# Patient Record
Sex: MALE | Race: WHITE | NOT HISPANIC OR LATINO | Employment: FULL TIME | URBAN - METROPOLITAN AREA
[De-identification: names, ages, dates, MRNs, and addresses within clinical notes are randomized per-mention and may not be internally consistent; named-entity substitution may affect disease eponyms.]

---

## 2020-02-19 ENCOUNTER — APPOINTMENT (OUTPATIENT)
Dept: RADIOLOGY | Facility: CLINIC | Age: 30
End: 2020-02-19
Payer: COMMERCIAL

## 2020-02-19 VITALS
HEIGHT: 70 IN | WEIGHT: 232.2 LBS | DIASTOLIC BLOOD PRESSURE: 67 MMHG | BODY MASS INDEX: 33.24 KG/M2 | HEART RATE: 54 BPM | SYSTOLIC BLOOD PRESSURE: 112 MMHG

## 2020-02-19 DIAGNOSIS — M25.512 LEFT SHOULDER PAIN, UNSPECIFIED CHRONICITY: Primary | ICD-10-CM

## 2020-02-19 DIAGNOSIS — M25.512 LEFT SHOULDER PAIN, UNSPECIFIED CHRONICITY: ICD-10-CM

## 2020-02-19 DIAGNOSIS — G54.0 BRACHIAL PLEXOPATHY: ICD-10-CM

## 2020-02-19 PROCEDURE — 99204 OFFICE O/P NEW MOD 45 MIN: CPT | Performed by: ORTHOPAEDIC SURGERY

## 2020-02-19 PROCEDURE — 73030 X-RAY EXAM OF SHOULDER: CPT

## 2024-01-02 NOTE — PROGRESS NOTES
Assessment/Plan:  1  Left shoulder pain, unspecified chronicity  XR shoulder 2+ vw left   2  Brachial plexopathy  EMG 1 Limb       Scribe Attestation    I,:   Kalyanyamileth Mar Angela am acting as a scribe while in the presence of the attending physician :        I,:   Josemanuel Boyer MD personally performed the services described in this documentation    as scribed in my presence :              Yuki Contreras is presenting with neurological symptoms of the left upper extremity  He does have sensation changes as well as pain in the C5 and C6 dermatome  The left shoulder has excellent strength and range of motion  I do have some concern of an injury to the brachial plexus verses a cervical radiculopathy  I have ordered an EMG study of the left upper extremity questioning this  Once the study has completed and the report is provided by Neurology he will return to see me and we will formulate a plan of care  Subjective:   Joseph Greene is a 30 y o  male who presents to the office today for evaluation of left shoulder  Yuki Contreras is as pain was left shoulder for over year  He describes the pain as a moderate ache that originates in the left trapezius and periscapular region and radiates into the left shoulder and medial biceps  He states it feels as though a nerve is pinched  Overuse of the left upper extremity from 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 will cause his shoulder to become aggravated  At times he feels as though the left arm becomes useless due to his shoulder pain  Yuki Contreras works as a   [employer] will cause an aggravation of his symptoms  Yuki Contreras does do resistance training  He notices a difference from the left and right upper extremity in regards to strength  Pertinent history includes an injury to left shoulder in high school when he ran into [employer]  He states the shoulder was immobilized in a sling for months      Review of Systems   Constitutional: Negative for chills, fever and unexpected weight change  HENT: Negative for hearing loss, nosebleeds and sore throat  Eyes: Negative for pain, redness and visual disturbance  Respiratory: Negative for cough, shortness of breath and wheezing  Cardiovascular: Negative for chest pain, palpitations and leg swelling  Gastrointestinal: Negative for abdominal pain, nausea and vomiting  Endocrine: Negative for polyphagia and polyuria  Genitourinary: Negative for dysuria and hematuria  Musculoskeletal:        See HPI   Skin: Negative for rash and wound  Neurological: Negative for dizziness, numbness and headaches  Psychiatric/Behavioral: Negative for decreased concentration and suicidal ideas  The patient is not nervous/anxious  History reviewed  No pertinent past medical history  Past Surgical History:   Procedure Laterality Date    APPENDECTOMY         Family History   Problem Relation Age of Onset    No Known Problems Mother     No Known Problems Father     No Known Problems Sister     No Known Problems Brother     No Known Problems Maternal Aunt     No Known Problems Maternal Uncle     No Known Problems Paternal Aunt     No Known Problems Paternal Uncle     No Known Problems Maternal Grandmother     No Known Problems Maternal Grandfather     No Known Problems Paternal Grandmother     No Known Problems Paternal Grandfather        Social History     Occupational History    Not on file   Tobacco Use    Smoking status: Never Smoker    Smokeless tobacco: Never Used   Substance and Sexual Activity    Alcohol use: Yes     Comment: socially    Drug use: Not on file    Sexual activity: Not on file       No current outpatient medications on file  No Known Allergies    Objective:  Vitals:    02/19/20 1041   BP: 112/67   Pulse: (!) 54       Back Exam     Tenderness   The patient is experiencing tenderness in the cervical (Left trapezius, paraspinal)        Left Shoulder Exam     Tenderness   The patient is experiencing no tenderness  Range of Motion   Active abduction: 160   External rotation: 80   Forward flexion: 180   Internal rotation 0 degrees: T1     Muscle Strength   Abduction: 5/5   Internal rotation: 5/5   External rotation: 5/5   Supraspinatus: 5/5   Subscapularis: 5/5   Biceps: 5/5     Tests   Hargrove test: negative  Impingement: negative    Other   Erythema: absent  Sensation: decreased (C5-C6 dermatome)  Pulse: present             Physical Exam   Constitutional: He is oriented to person, place, and time  He appears well-developed and well-nourished  HENT:   Head: Normocephalic and atraumatic  Eyes: Conjunctivae are normal  Right eye exhibits no discharge  Left eye exhibits no discharge  Neck: Normal range of motion  Neck supple  Cardiovascular: Regular rhythm and intact distal pulses  Pulmonary/Chest: Effort normal  No respiratory distress  Neurological: He is alert and oriented to person, place, and time  Skin: Skin is warm and dry  Psychiatric: He has a normal mood and affect  His behavior is normal    Vitals reviewed  I have personally reviewed pertinent films in PACS and my interpretation is as follows:  X-rays left shoulder demonstrate no evidence of acute fracture  There is questionable evidence of an old fracture to the posterior head of the humerus  There is also history of previous acromioclavicular joint sprain  104.3

## 2025-04-18 ENCOUNTER — APPOINTMENT (OUTPATIENT)
Dept: RADIOLOGY | Facility: CLINIC | Age: 35
End: 2025-04-18
Attending: ORTHOPAEDIC SURGERY
Payer: COMMERCIAL

## 2025-04-18 ENCOUNTER — OFFICE VISIT (OUTPATIENT)
Age: 35
End: 2025-04-18
Payer: COMMERCIAL

## 2025-04-18 VITALS — WEIGHT: 210 LBS | HEIGHT: 70 IN | BODY MASS INDEX: 30.06 KG/M2

## 2025-04-18 DIAGNOSIS — M25.522 PAIN IN LEFT ELBOW: ICD-10-CM

## 2025-04-18 DIAGNOSIS — M79.602 LEFT ARM PAIN: ICD-10-CM

## 2025-04-18 DIAGNOSIS — S46.212A RUPTURE OF LEFT DISTAL BICEPS TENDON, INITIAL ENCOUNTER: Primary | ICD-10-CM

## 2025-04-18 PROCEDURE — 99204 OFFICE O/P NEW MOD 45 MIN: CPT | Performed by: ORTHOPAEDIC SURGERY

## 2025-04-18 PROCEDURE — 73080 X-RAY EXAM OF ELBOW: CPT

## 2025-04-18 NOTE — ASSESSMENT & PLAN NOTE
Stat MRI ordered today, patient will follow up on Tuesday/Wednesday to review MRI and discuss surgery further  Orders:  •  MRI elbow left wo contrast; Future

## 2025-04-18 NOTE — PROGRESS NOTES
Patient Name: Shyam Headley      : 1990       MRN: [id number]   Encounter Provider: Marco You MD   Encounter Date: 25  Encounter department: [facility] [place]         Assessment & Plan  Rupture of left distal biceps tendon, initial encounter  Stat MRI ordered today, patient will follow up on Tuesday/Wednesday to review MRI and discuss surgery further  Orders:  •  MRI elbow left wo contrast; Future    Pain in left elbow    Orders:  •  XR elbow 3+ vw left; Future  •  MRI elbow left wo contrast; Future       Plan:  Shyam presents for initial evaluation of acute left elbow injury. Upon review of the left elbow x-ray, a thorough history and my examination, Shyam is presenting with signs and symptoms consistent with distal bicep tendon rupture. Etiology and treatment options discussed, all his questions were addressed. Based on patients age and the physical nature of his job I would recommend surgical intervention in the form of left elbow arthroscopy with bicep repair. A STAT MRI is warranted for further evaluation and pre-operative planning. Patient will follow next week to review MRI and discuss surgery further.       Follow-up:  No follow-ups on file.     _____________________________________________________  CHIEF COMPLAINT:  Chief Complaint   Patient presents with   • Left Upper Arm - Pain         SUBJECTIVE:  Shyam Headley is a 35 y.o. male who presents for initial evaluation of left elbow. Patient had a hyperextension injury yesterday, he felt a pop followed by pain. Pain is localized to the cubital fossa. Pain is aggravated with pronation/supination of the wrist and elbow flexion. Pain radiates into proximal forearm. He notes a deformity to his bicep with weakness.       PAST MEDICAL HISTORY:  No past medical history on file.    PAST SURGICAL HISTORY:  Past Surgical History:   Procedure Laterality Date   • APPENDECTOMY         FAMILY HISTORY:  Family  "History   Problem Relation Age of Onset   • No Known Problems Mother    • No Known Problems Father    • No Known Problems Sister    • No Known Problems Brother    • No Known Problems Maternal Aunt    • No Known Problems Maternal Uncle    • No Known Problems Paternal Aunt    • No Known Problems Paternal Uncle    • No Known Problems Maternal Grandmother    • No Known Problems Maternal Grandfather    • No Known Problems Paternal Grandmother    • No Known Problems Paternal Grandfather        SOCIAL HISTORY:  Social History     Tobacco Use   • Smoking status: Never   • Smokeless tobacco: Never   Vaping Use   • Vaping status: Never Used   Substance Use Topics   • Alcohol use: Yes     Comment: socially       MEDICATIONS:  No current outpatient medications on file.    ALLERGIES:  No Known Allergies    LABS:  HgA1c: No results found for: \"HGBA1C\"  BMP: No results found for: \"GLUCOSE\", \"CALCIUM\", \"NA\", \"K\", \"CO2\", \"CL\", \"BUN\", \"CREATININE\"  CBC: No components found for: \"CBC\"    _____________________________________________________  Review of Systems   Constitutional:  Negative for chills and fever.   HENT:  Negative for ear pain and sore throat.    Eyes:  Negative for pain and visual disturbance.   Respiratory:  Negative for cough and shortness of breath.    Cardiovascular:  Negative for chest pain and palpitations.   Gastrointestinal:  Negative for abdominal pain and vomiting.   Genitourinary:  Negative for dysuria and hematuria.   Musculoskeletal:  Negative for arthralgias and back pain.   Skin:  Negative for color change and rash.   Neurological:  Negative for seizures and syncope.   All other systems reviewed and are negative.       Left Elbow Exam     Tenderness   Left elbow tenderness location: distal bicep.     Tests   Varus: negative  Valgus: negative    Other   Erythema: absent  Sensation: normal  Pulse: present    Comments:  Pain with resisted supination  (+) hook test   No significant ecchymosis  AROM limited " secondary to injury  (-) radial head instability  (-) ulnar nerve subluxation  2+ distal radial pulse with brisk capillary refill to the fingers  Radial, median, and ulnar motor and sensory distrubution intact  Sensation to light touch intact distally                Physical Exam  Vitals and nursing note reviewed.   Constitutional:       Appearance: Normal appearance.   HENT:      Head: Normocephalic and atraumatic.      Right Ear: External ear normal.      Left Ear: External ear normal.   Eyes:      Extraocular Movements: Extraocular movements intact.      Conjunctiva/sclera: Conjunctivae normal.   Cardiovascular:      Rate and Rhythm: Normal rate.      Pulses: Normal pulses.   Pulmonary:      Effort: Pulmonary effort is normal.   Musculoskeletal:         General: Normal range of motion.      Cervical back: Normal range of motion and neck supple.      Comments: See ortho exam   Skin:     General: Skin is warm and dry.   Neurological:      General: No focal deficit present.      Mental Status: He is alert.   Psychiatric:         Behavior: Behavior normal.        _____________________________________________________  STUDIES REVIEWED:  I personally reviewed the pertinent images and my independent interpretation is as follows:  X-rays of the left elbow obtained today demonstrate no acute osseous abnormalities     PROCEDURES PERFORMED:  No Procedures performed today    Scribe Attestation    I,:  Kalie Spring am acting as a scribe while in the presence of the attending physician.:       I,:  Marco You MD personally performed the services described in this documentation    as scribed in my presence.:

## 2025-04-19 ENCOUNTER — HOSPITAL ENCOUNTER (OUTPATIENT)
Dept: RADIOLOGY | Facility: HOSPITAL | Age: 35
Discharge: HOME | End: 2025-04-19
Attending: ORTHOPAEDIC SURGERY
Payer: COMMERCIAL

## 2025-04-19 DIAGNOSIS — M25.522 PAIN IN LEFT ELBOW: ICD-10-CM

## 2025-04-19 DIAGNOSIS — S46.212A RUPTURE OF LEFT DISTAL BICEPS TENDON, INITIAL ENCOUNTER: ICD-10-CM

## 2025-04-19 PROCEDURE — 73221 MRI JOINT UPR EXTREM W/O DYE: CPT

## 2025-04-22 RX ORDER — CHLORHEXIDINE GLUCONATE ORAL RINSE 1.2 MG/ML
15 SOLUTION DENTAL ONCE
Status: CANCELLED | OUTPATIENT
Start: 2025-04-22 | End: 2025-04-22

## 2025-04-23 ENCOUNTER — ANESTHESIA EVENT (OUTPATIENT)
Dept: PERIOP | Facility: HOSPITAL | Age: 35
End: 2025-04-23
Payer: COMMERCIAL

## 2025-04-23 VITALS — WEIGHT: 210 LBS | HEIGHT: 70 IN | BODY MASS INDEX: 30.06 KG/M2

## 2025-04-23 DIAGNOSIS — M25.522 PAIN IN LEFT ELBOW: ICD-10-CM

## 2025-04-23 DIAGNOSIS — S46.212D RUPTURE OF LEFT DISTAL BICEPS TENDON, SUBSEQUENT ENCOUNTER: Primary | ICD-10-CM

## 2025-04-23 PROCEDURE — 99214 OFFICE O/P EST MOD 30 MIN: CPT | Performed by: ORTHOPAEDIC SURGERY

## 2025-04-23 RX ORDER — MAGNESIUM 30 MG
30 TABLET ORAL 2 TIMES DAILY
COMMUNITY

## 2025-04-23 NOTE — H&P (VIEW-ONLY)
Patient Name: Shyam Headley      : 1990       MRN: [id number]   Encounter Provider: Marco You MD   Encounter Date: 25  Encounter department: [facility] [place]         Assessment & Plan  Rupture of left distal biceps tendon, subsequent encounter  left elbow distal biceps tendon repair, surgery discussion below       Pain in left elbow            Plan:  Shyam is a 35 y.o. with acute left bicep tendon rupture. MRI reviewed with patient today and treatment options discussed.  We did discuss both operative and nonoperative treatments for biceps tendon tears.  We discussed without surgical invention he may have some chronic weakness with supination and somewhat flexion.  He is an active  and would require full strength to get back to work and do all of his activities.  He also exercises and is active with weightlifting.  After full discussion he would like to pursue operative intervention.  All questions were answered.   We discussed risks, benefits and alternatives to surgery today in the clinic. We discussed with the patient the risks of no treatment, non-operative treatment, and operative treatment. The risks of operative intervention were discussed and include but are not limited to: Infection, bleeding, stiffness, loss of range of motion, blood clot, failure of surgery, fracture, delayed union, nonunion, malunion, risk of potential future arthritis, continued problems with swelling, injury to surrounding structures/nerve/artery/vein, recurrence of cysts, failure of hardware, retained hardware and/or foreign body, symptomatic hardware, and continued instability, pain, dysfunction, or disability despite repair.     Specifically, for     Arthroscopy with distal biceps tendon repair : Risk of Sofia tear.  Risk of numbness in the lateral antebrachial cutaneous nerve distribution.  Risk of neurovascular injury.  Risk of heterotopic  "ossification.      Follow-up:  No follow-ups on file.     _____________________________________________________  CHIEF COMPLAINT:  Chief Complaint   Patient presents with   • Left Upper Arm - Post-op         SUBJECTIVE:  malik Headley is a 35 y.o. male who presents for follow up evaluation of left elbow secondary to bicep tendon rupture. MRI was reviewed with patient over the phone he presents today to discuss surgery and sign consent.      PAST MEDICAL HISTORY:  History reviewed. No pertinent past medical history.    PAST SURGICAL HISTORY:  Past Surgical History:   Procedure Laterality Date   • APPENDECTOMY         FAMILY HISTORY:  Family History   Problem Relation Age of Onset   • No Known Problems Mother    • No Known Problems Father    • No Known Problems Sister    • No Known Problems Brother    • No Known Problems Maternal Aunt    • No Known Problems Maternal Uncle    • No Known Problems Paternal Aunt    • No Known Problems Paternal Uncle    • No Known Problems Maternal Grandmother    • No Known Problems Maternal Grandfather    • No Known Problems Paternal Grandmother    • No Known Problems Paternal Grandfather        SOCIAL HISTORY:  Social History     Tobacco Use   • Smoking status: Never   • Smokeless tobacco: Never   Vaping Use   • Vaping status: Never Used   Substance Use Topics   • Alcohol use: Not Currently     Comment: socially   • Drug use: Never       MEDICATIONS:  No current facility-administered medications for this visit.  No current outpatient medications on file.    Facility-Administered Medications Ordered in Other Visits:   •  bupivacaine liposomal (EXPAREL) 1.3 % injection 20 mL, 20 mL, Infiltration, Once, Marcus Leon MD  •  lactated ringers infusion, 75 mL/hr, Intravenous, Continuous, Marcus Leon MD    ALLERGIES:  No Known Allergies    LABS:  HgA1c: No results found for: \"HGBA1C\"  BMP: No results found for: \"GLUCOSE\", \"CALCIUM\", \"NA\", \"K\", \"CO2\", \"CL\", \"BUN\", " "\"CREATININE\"  CBC: No components found for: \"CBC\"    _____________________________________________________  Review of Systems   Constitutional:  Negative for chills and fever.   HENT:  Negative for ear pain and sore throat.    Eyes:  Negative for pain and visual disturbance.   Respiratory:  Negative for cough and shortness of breath.    Cardiovascular:  Negative for chest pain and palpitations.   Gastrointestinal:  Negative for abdominal pain and vomiting.   Genitourinary:  Negative for dysuria and hematuria.   Musculoskeletal:  Negative for arthralgias and back pain.   Skin:  Negative for color change and rash.   Neurological:  Negative for seizures and syncope.   All other systems reviewed and are negative.       Left Elbow Exam     Other   Erythema: absent  Sensation: normal  Pulse: present    Comments:  Pain with resisted supination  (+) hook test   No significant ecchymosis  AROM limited secondary to injury  (-) radial head instability  (-) ulnar nerve subluxation  2+ distal radial pulse with brisk capillary refill to the fingers  Radial, median, and ulnar motor and sensory distrubution intact  Sensation to light touch intact distally                Physical Exam  Vitals and nursing note reviewed.   Constitutional:       Appearance: Normal appearance.   HENT:      Head: Normocephalic and atraumatic.      Right Ear: External ear normal.      Left Ear: External ear normal.   Eyes:      Extraocular Movements: Extraocular movements intact.      Conjunctiva/sclera: Conjunctivae normal.   Cardiovascular:      Rate and Rhythm: Normal rate.      Pulses: Normal pulses.   Pulmonary:      Effort: Pulmonary effort is normal.   Musculoskeletal:         General: Normal range of motion.      Cervical back: Normal range of motion and neck supple.      Comments: See ortho exam   Skin:     General: Skin is warm and dry.   Neurological:      General: No focal deficit present.      Mental Status: He is alert.   Psychiatric:        "  Behavior: Behavior normal.     Abdominal: Soft nontender  _____________________________________________________  STUDIES REVIEWED:  I personally reviewed the pertinent images and my independent interpretation is as follows:  MRI of left elbow obtained 4/19/25 demonstrates complete tear of the distal biceps brachii tendon with torn tendon edge retracted    PROCEDURES PERFORMED:  No Procedures performed today    Scribe Attestation    I,:  Kalie Spring am acting as a scribe while in the presence of the attending physician.:       I,:  Marco You MD personally performed the services described in this documentation    as scribed in my presence.:

## 2025-04-23 NOTE — PROGRESS NOTES
Patient Name: Shyam Headley      : 1990       MRN: [id number]   Encounter Provider: Marco You MD   Encounter Date: 25  Encounter department: [facility]         Assessment & Plan  Rupture of left distal biceps tendon, subsequent encounter  left elbow distal biceps tendon repair, surgery discussion below       Pain in left elbow            Plan:  Shyam is a 35 y.o. with acute left bicep tendon rupture. MRI reviewed with patient today and treatment options discussed.  We did discuss both operative and nonoperative treatments for biceps tendon tears.  We discussed without surgical invention he may have some chronic weakness with supination and somewhat flexion.  He is an active  and would require full strength to get back to work and do all of his activities.  He also exercises and is active with weightlifting.  After full discussion he would like to pursue operative intervention.  All questions were answered.   We discussed risks, benefits and alternatives to surgery today in the clinic. We discussed with the patient the risks of no treatment, non-operative treatment, and operative treatment. The risks of operative intervention were discussed and include but are not limited to: Infection, bleeding, stiffness, loss of range of motion, blood clot, failure of surgery, fracture, delayed union, nonunion, malunion, risk of potential future arthritis, continued problems with swelling, injury to surrounding structures/nerve/artery/vein, recurrence of cysts, failure of hardware, retained hardware and/or foreign body, symptomatic hardware, and continued instability, pain, dysfunction, or disability despite repair.     Specifically, for     Arthroscopy with distal biceps tendon repair : Risk of Sofia tear.  Risk of numbness in the lateral antebrachial cutaneous nerve distribution.  Risk of neurovascular injury.  Risk of heterotopic  "ossification.      Follow-up:  No follow-ups on file.     _____________________________________________________  CHIEF COMPLAINT:  Chief Complaint   Patient presents with   • Left Upper Arm - Post-op         SUBJECTIVE:  malik Headley is a 35 y.o. male who presents for follow up evaluation of left elbow secondary to bicep tendon rupture. MRI was reviewed with patient over the phone he presents today to discuss surgery and sign consent.      PAST MEDICAL HISTORY:  History reviewed. No pertinent past medical history.    PAST SURGICAL HISTORY:  Past Surgical History:   Procedure Laterality Date   • APPENDECTOMY         FAMILY HISTORY:  Family History   Problem Relation Age of Onset   • No Known Problems Mother    • No Known Problems Father    • No Known Problems Sister    • No Known Problems Brother    • No Known Problems Maternal Aunt    • No Known Problems Maternal Uncle    • No Known Problems Paternal Aunt    • No Known Problems Paternal Uncle    • No Known Problems Maternal Grandmother    • No Known Problems Maternal Grandfather    • No Known Problems Paternal Grandmother    • No Known Problems Paternal Grandfather        SOCIAL HISTORY:  Social History     Tobacco Use   • Smoking status: Never   • Smokeless tobacco: Never   Vaping Use   • Vaping status: Never Used   Substance Use Topics   • Alcohol use: Not Currently     Comment: socially   • Drug use: Never       MEDICATIONS:  No current facility-administered medications for this visit.  No current outpatient medications on file.    Facility-Administered Medications Ordered in Other Visits:   •  bupivacaine liposomal (EXPAREL) 1.3 % injection 20 mL, 20 mL, Infiltration, Once, Marcus Leon MD  •  lactated ringers infusion, 75 mL/hr, Intravenous, Continuous, Marcus Leon MD    ALLERGIES:  No Known Allergies    LABS:  HgA1c: No results found for: \"HGBA1C\"  BMP: No results found for: \"GLUCOSE\", \"CALCIUM\", \"NA\", \"K\", \"CO2\", \"CL\", \"BUN\", " "\"CREATININE\"  CBC: No components found for: \"CBC\"    _____________________________________________________  Review of Systems   Constitutional:  Negative for chills and fever.   HENT:  Negative for ear pain and sore throat.    Eyes:  Negative for pain and visual disturbance.   Respiratory:  Negative for cough and shortness of breath.    Cardiovascular:  Negative for chest pain and palpitations.   Gastrointestinal:  Negative for abdominal pain and vomiting.   Genitourinary:  Negative for dysuria and hematuria.   Musculoskeletal:  Negative for arthralgias and back pain.   Skin:  Negative for color change and rash.   Neurological:  Negative for seizures and syncope.   All other systems reviewed and are negative.       Left Elbow Exam     Other   Erythema: absent  Sensation: normal  Pulse: present    Comments:  Pain with resisted supination  (+) hook test   No significant ecchymosis  AROM limited secondary to injury  (-) radial head instability  (-) ulnar nerve subluxation  2+ distal radial pulse with brisk capillary refill to the fingers  Radial, median, and ulnar motor and sensory distrubution intact  Sensation to light touch intact distally                Physical Exam  Vitals and nursing note reviewed.   Constitutional:       Appearance: Normal appearance.   HENT:      Head: Normocephalic and atraumatic.      Right Ear: External ear normal.      Left Ear: External ear normal.   Eyes:      Extraocular Movements: Extraocular movements intact.      Conjunctiva/sclera: Conjunctivae normal.   Cardiovascular:      Rate and Rhythm: Normal rate.      Pulses: Normal pulses.   Pulmonary:      Effort: Pulmonary effort is normal.   Musculoskeletal:         General: Normal range of motion.      Cervical back: Normal range of motion and neck supple.      Comments: See ortho exam   Skin:     General: Skin is warm and dry.   Neurological:      General: No focal deficit present.      Mental Status: He is alert.   Psychiatric:        "  Behavior: Behavior normal.     Abdominal: Soft nontender  _____________________________________________________  STUDIES REVIEWED:  I personally reviewed the pertinent images and my independent interpretation is as follows:  MRI of left elbow obtained 4/19/25 demonstrates complete tear of the distal biceps brachii tendon with torn tendon edge retracted    PROCEDURES PERFORMED:  No Procedures performed today    Scribe Attestation    I,:  Kalie Spring am acting as a scribe while in the presence of the attending physician.:       I,:  Marco You MD personally performed the services described in this documentation    as scribed in my presence.:

## 2025-04-23 NOTE — PRE-PROCEDURE INSTRUCTIONS
Pre-Surgery Instructions:   Medication Instructions    magnesium 30 MG tablet Hold day of surgery.    Multiple Vitamin (multivitamin) capsule Hold day of surgery.    Turmeric (QC TUMERIC COMPLEX PO) Hold day of surgery.     Medication instructions for day of surgery reviewed. Please take all instructed medications with only a sip of water.       You will receive a call one business day prior to surgery with an arrival time and hospital directions. If your surgery is scheduled on a Monday, the hospital will be calling you on the Friday prior to your surgery. If you have not heard from anyone by 8pm, please call the hospital supervisor through the hospital  at [phone]. ([place] [phone] or [place] [phone]).    Do not eat or drink anything after midnight the night before your surgery, including candy, mints, lifesavers, or chewing gum. Do not drink alcohol 24hrs before your surgery. Try not to smoke at least 24hrs before your surgery.       Follow the pre surgery showering instructions as listed in the “My Surgical Experience Booklet” or otherwise provided by your surgeon's office. Do not use a blade to shave the surgical area 1 week before surgery. It is okay to use a clean electric clippers up to 24 hours before surgery. Do not apply any lotions, creams, including makeup, cologne, deodorant, or perfumes after showering on the day of your surgery. Do not use dry shampoo, hair spray, hair gel, or any type of hair products.     No contact lenses, eye make-up, or artificial eyelashes. Remove nail polish, including gel polish, and any artificial, gel, or acrylic nails if possible. Remove all jewelry including rings and body piercing jewelry.     Wear causal clothing that is easy to take on and off. Consider your type of surgery.    Keep any valuables, jewelry, piercings at home. Please bring any specially ordered equipment (sling, braces) if indicated.    Arrange for a responsible person to drive  you to and from the hospital on the day of your surgery. Please confirm the visitor policy for the day of your procedure when you receive your phone call with an arrival time.     Call the surgeon's office with any new illnesses, exposures, or additional questions prior to surgery.    Please reference your “My Surgical Experience Booklet” for additional information to prepare for your upcoming surgery.    Pt. Verbalized an understanding of all instructions reviewed and offers no concerns at this time.

## 2025-04-23 NOTE — ANESTHESIA PREPROCEDURE EVALUATION
Procedure:  REPAIR TENDON BICEPS, Distal (Left: Arm Upper)    Relevant Problems   Rheumatology   (+) Rupture of left distal biceps tendon        Physical Exam    Airway    Mallampati score: II  TM Distance: >3 FB  Neck ROM: full     Dental       Cardiovascular  Rhythm: regular, Rate: normal    Pulmonary   Breath sounds clear to auscultation    Other Findings        Anesthesia Plan  ASA Score- 1     Anesthesia Type- general with ASA Monitors.         Additional Monitors:     Airway Plan: ETT.    Comment: GA with ETT; pre-procedure interscalene block.       Plan Factors-    Chart reviewed.        Patient is not a current smoker.              Induction- intravenous.    Postoperative Plan- Plan for postoperative opioid use.     Perioperative Resuscitation Plan - Level 1 - Full Code.       Informed Consent- Anesthetic plan and risks discussed with patient.  I personally reviewed this patient with the CRNA. Discussed and agreed on the Anesthesia Plan with the CRNA..      NPO Status:  No vitals data found for the desired time range.

## 2025-04-24 ENCOUNTER — HOSPITAL ENCOUNTER (OUTPATIENT)
Facility: HOSPITAL | Age: 35
Setting detail: OUTPATIENT SURGERY
Discharge: HOME | End: 2025-04-24
Attending: ORTHOPAEDIC SURGERY | Admitting: ORTHOPAEDIC SURGERY
Payer: COMMERCIAL

## 2025-04-24 ENCOUNTER — ANESTHESIA (OUTPATIENT)
Dept: PERIOP | Facility: HOSPITAL | Age: 35
End: 2025-04-24
Payer: COMMERCIAL

## 2025-04-24 VITALS
TEMPERATURE: 97.5 F | WEIGHT: 201.5 LBS | OXYGEN SATURATION: 96 % | RESPIRATION RATE: 20 BRPM | HEART RATE: 56 BPM | HEIGHT: 70 IN | SYSTOLIC BLOOD PRESSURE: 106 MMHG | BODY MASS INDEX: 28.85 KG/M2 | DIASTOLIC BLOOD PRESSURE: 70 MMHG

## 2025-04-24 DIAGNOSIS — S46.212A RUPTURE OF LEFT DISTAL BICEPS TENDON, INITIAL ENCOUNTER: Primary | ICD-10-CM

## 2025-04-24 PROCEDURE — 24342 REPAIR OF RUPTURED TENDON: CPT | Performed by: ORTHOPAEDIC SURGERY

## 2025-04-24 PROCEDURE — 24342 REPAIR OF RUPTURED TENDON: CPT | Performed by: PHYSICIAN ASSISTANT

## 2025-04-24 PROCEDURE — C1713 ANCHOR/SCREW BN/BN,TIS/BN: HCPCS | Performed by: ORTHOPAEDIC SURGERY

## 2025-04-24 DEVICE — IMPL DELIVERY SYS,DISTAL BICEPS REPR
Type: IMPLANTABLE DEVICE | Status: FUNCTIONAL
Brand: ARTHREX®

## 2025-04-24 RX ORDER — BUPIVACAINE HYDROCHLORIDE 5 MG/ML
INJECTION, SOLUTION EPIDURAL; INTRACAUDAL; PERINEURAL
Status: COMPLETED | OUTPATIENT
Start: 2025-04-24 | End: 2025-04-24

## 2025-04-24 RX ORDER — FENTANYL CITRATE 50 UG/ML
INJECTION, SOLUTION INTRAMUSCULAR; INTRAVENOUS AS NEEDED
Status: DISCONTINUED | OUTPATIENT
Start: 2025-04-24 | End: 2025-04-24

## 2025-04-24 RX ORDER — CEFAZOLIN SODIUM 1 G/3ML
INJECTION, POWDER, FOR SOLUTION INTRAMUSCULAR; INTRAVENOUS AS NEEDED
Status: DISCONTINUED | OUTPATIENT
Start: 2025-04-24 | End: 2025-04-24

## 2025-04-24 RX ORDER — MIDAZOLAM HYDROCHLORIDE 2 MG/2ML
INJECTION, SOLUTION INTRAMUSCULAR; INTRAVENOUS AS NEEDED
Status: DISCONTINUED | OUTPATIENT
Start: 2025-04-24 | End: 2025-04-24

## 2025-04-24 RX ORDER — METOCLOPRAMIDE HYDROCHLORIDE 5 MG/ML
10 INJECTION INTRAMUSCULAR; INTRAVENOUS ONCE AS NEEDED
Status: DISCONTINUED | OUTPATIENT
Start: 2025-04-24 | End: 2025-04-24 | Stop reason: HOSPADM

## 2025-04-24 RX ORDER — NAPROXEN 500 MG/1
500 TABLET ORAL 2 TIMES DAILY WITH MEALS
Qty: 60 TABLET | Refills: 0 | Status: SHIPPED | OUTPATIENT
Start: 2025-04-24

## 2025-04-24 RX ORDER — GLYCOPYRROLATE 0.2 MG/ML
INJECTION INTRAMUSCULAR; INTRAVENOUS AS NEEDED
Status: DISCONTINUED | OUTPATIENT
Start: 2025-04-24 | End: 2025-04-24

## 2025-04-24 RX ORDER — SODIUM CHLORIDE, SODIUM LACTATE, POTASSIUM CHLORIDE, CALCIUM CHLORIDE 600; 310; 30; 20 MG/100ML; MG/100ML; MG/100ML; MG/100ML
75 INJECTION, SOLUTION INTRAVENOUS CONTINUOUS
Status: DISCONTINUED | OUTPATIENT
Start: 2025-04-24 | End: 2025-04-24 | Stop reason: HOSPADM

## 2025-04-24 RX ORDER — ONDANSETRON 4 MG/1
4 TABLET, FILM COATED ORAL EVERY 8 HOURS PRN
Qty: 10 TABLET | Refills: 0 | Status: SHIPPED | OUTPATIENT
Start: 2025-04-24

## 2025-04-24 RX ORDER — FENTANYL CITRATE/PF 50 MCG/ML
25 SYRINGE (ML) INJECTION
Refills: 0 | Status: DISCONTINUED | OUTPATIENT
Start: 2025-04-24 | End: 2025-04-24 | Stop reason: HOSPADM

## 2025-04-24 RX ORDER — MIDAZOLAM HYDROCHLORIDE 2 MG/ML
SYRUP ORAL AS NEEDED
Status: DISCONTINUED | OUTPATIENT
Start: 2025-04-24 | End: 2025-04-24

## 2025-04-24 RX ORDER — KETOROLAC TROMETHAMINE 30 MG/ML
INJECTION, SOLUTION INTRAMUSCULAR; INTRAVENOUS AS NEEDED
Status: DISCONTINUED | OUTPATIENT
Start: 2025-04-24 | End: 2025-04-24

## 2025-04-24 RX ORDER — OXYCODONE HYDROCHLORIDE 5 MG/1
5 TABLET ORAL EVERY 6 HOURS PRN
Qty: 20 TABLET | Refills: 0 | Status: SHIPPED | OUTPATIENT
Start: 2025-04-24 | End: 2025-05-04

## 2025-04-24 RX ORDER — ALBUTEROL SULFATE 0.83 MG/ML
2.5 SOLUTION RESPIRATORY (INHALATION) ONCE AS NEEDED
Status: DISCONTINUED | OUTPATIENT
Start: 2025-04-24 | End: 2025-04-24 | Stop reason: HOSPADM

## 2025-04-24 RX ORDER — SODIUM CHLORIDE, SODIUM LACTATE, POTASSIUM CHLORIDE, CALCIUM CHLORIDE 600; 310; 30; 20 MG/100ML; MG/100ML; MG/100ML; MG/100ML
INJECTION, SOLUTION INTRAVENOUS CONTINUOUS PRN
Status: DISCONTINUED | OUTPATIENT
Start: 2025-04-24 | End: 2025-04-24

## 2025-04-24 RX ORDER — CHLORHEXIDINE GLUCONATE ORAL RINSE 1.2 MG/ML
15 SOLUTION DENTAL ONCE
Status: COMPLETED | OUTPATIENT
Start: 2025-04-24 | End: 2025-04-24

## 2025-04-24 RX ORDER — ACETAMINOPHEN 500 MG
1000 TABLET ORAL EVERY 8 HOURS
Qty: 60 TABLET | Refills: 0 | Status: SHIPPED | OUTPATIENT
Start: 2025-04-24

## 2025-04-24 RX ORDER — LIDOCAINE HYDROCHLORIDE 10 MG/ML
INJECTION, SOLUTION EPIDURAL; INFILTRATION; INTRACAUDAL; PERINEURAL AS NEEDED
Status: DISCONTINUED | OUTPATIENT
Start: 2025-04-24 | End: 2025-04-24

## 2025-04-24 RX ORDER — HYDROMORPHONE HCL IN WATER/PF 6 MG/30 ML
0.2 PATIENT CONTROLLED ANALGESIA SYRINGE INTRAVENOUS
Status: DISCONTINUED | OUTPATIENT
Start: 2025-04-24 | End: 2025-04-24 | Stop reason: HOSPADM

## 2025-04-24 RX ORDER — EPHEDRINE SULFATE 50 MG/ML
INJECTION INTRAVENOUS AS NEEDED
Status: DISCONTINUED | OUTPATIENT
Start: 2025-04-24 | End: 2025-04-24

## 2025-04-24 RX ORDER — CEFAZOLIN SODIUM 2 G/50ML
2000 SOLUTION INTRAVENOUS ONCE
Status: DISCONTINUED | OUTPATIENT
Start: 2025-04-24 | End: 2025-04-24 | Stop reason: HOSPADM

## 2025-04-24 RX ORDER — ONDANSETRON 2 MG/ML
INJECTION INTRAMUSCULAR; INTRAVENOUS AS NEEDED
Status: DISCONTINUED | OUTPATIENT
Start: 2025-04-24 | End: 2025-04-24

## 2025-04-24 RX ORDER — DEXAMETHASONE SODIUM PHOSPHATE 10 MG/ML
INJECTION, SOLUTION INTRAMUSCULAR; INTRAVENOUS AS NEEDED
Status: DISCONTINUED | OUTPATIENT
Start: 2025-04-24 | End: 2025-04-24

## 2025-04-24 RX ORDER — PROPOFOL 10 MG/ML
INJECTION, EMULSION INTRAVENOUS AS NEEDED
Status: DISCONTINUED | OUTPATIENT
Start: 2025-04-24 | End: 2025-04-24

## 2025-04-24 RX ADMIN — CHLORHEXIDINE GLUCONATE 15 ML: 1.2 SOLUTION ORAL at 10:00

## 2025-04-24 RX ADMIN — SODIUM CHLORIDE, SODIUM LACTATE, POTASSIUM CHLORIDE, AND CALCIUM CHLORIDE: .6; .31; .03; .02 INJECTION, SOLUTION INTRAVENOUS at 12:33

## 2025-04-24 RX ADMIN — DEXAMETHASONE SODIUM PHOSPHATE 10 MG: 10 INJECTION, SOLUTION INTRAMUSCULAR; INTRAVENOUS at 12:45

## 2025-04-24 RX ADMIN — MIDAZOLAM 2 MG: 1 INJECTION INTRAMUSCULAR; INTRAVENOUS at 12:22

## 2025-04-24 RX ADMIN — SODIUM CHLORIDE, SODIUM LACTATE, POTASSIUM CHLORIDE, AND CALCIUM CHLORIDE: .6; .31; .03; .02 INJECTION, SOLUTION INTRAVENOUS at 13:43

## 2025-04-24 RX ADMIN — LIDOCAINE HYDROCHLORIDE 40 MG: 10 INJECTION, SOLUTION EPIDURAL; INFILTRATION; INTRACAUDAL at 12:42

## 2025-04-24 RX ADMIN — CEFAZOLIN 2000 MG: 1 INJECTION, POWDER, FOR SOLUTION INTRAMUSCULAR; INTRAVENOUS at 12:45

## 2025-04-24 RX ADMIN — FENTANYL CITRATE 25 MCG: 50 INJECTION, SOLUTION INTRAMUSCULAR; INTRAVENOUS at 12:47

## 2025-04-24 RX ADMIN — BUPIVACAINE 20 ML: 13.3 INJECTION, SUSPENSION, LIPOSOMAL INFILTRATION at 12:28

## 2025-04-24 RX ADMIN — BUPIVACAINE HYDROCHLORIDE 5 ML: 5 INJECTION, SOLUTION EPIDURAL; INTRACAUDAL; PERINEURAL at 12:28

## 2025-04-24 RX ADMIN — KETOROLAC TROMETHAMINE 30 MG: 30 INJECTION, SOLUTION INTRAMUSCULAR; INTRAVENOUS at 13:51

## 2025-04-24 RX ADMIN — PROPOFOL 200 MG: 10 INJECTION, EMULSION INTRAVENOUS at 12:42

## 2025-04-24 RX ADMIN — GLYCOPYRROLATE 0.1 MCG: 0.2 INJECTION, SOLUTION INTRAMUSCULAR; INTRAVENOUS at 12:54

## 2025-04-24 RX ADMIN — EPHEDRINE SULFATE 5 MG: 50 INJECTION, SOLUTION INTRAVENOUS at 13:08

## 2025-04-24 RX ADMIN — ONDANSETRON 4 MG: 2 INJECTION INTRAMUSCULAR; INTRAVENOUS at 12:42

## 2025-04-24 RX ADMIN — FENTANYL CITRATE 25 MCG: 50 INJECTION, SOLUTION INTRAMUSCULAR; INTRAVENOUS at 13:01

## 2025-04-24 RX ADMIN — FENTANYL CITRATE 50 MCG: 50 INJECTION, SOLUTION INTRAMUSCULAR; INTRAVENOUS at 12:22

## 2025-04-24 NOTE — NURSING NOTE
Pt returned from PACU. Pt is alert and oriented times 4. Call bell within reach, bed in lowest position and locked, side rails up, beverage at bedside. Per pt, no questions or concerns at this time.

## 2025-04-24 NOTE — ANESTHESIA POSTPROCEDURE EVALUATION
Post-Op Assessment Note    CV Status:  Stable  Pain Score: 0    Pain management: adequate       Mental Status:  Arousable   Hydration Status:  Stable   PONV Controlled:  Controlled   Airway Patency:  Patent  Two or more mitigation strategies used for obstructive sleep apnea   Post Op Vitals Reviewed: Yes    No anethesia notable event occurred.    Staff: CRNA           Last Filed PACU Vitals:  Vitals Value Taken Time   Temp 97.5    Pulse 86    /76    Resp 14    SpO2 98

## 2025-04-24 NOTE — ANESTHESIA PROCEDURE NOTES
Peripheral Block    Patient location during procedure: holding area  Start time: 4/24/2025 12:28 PM  Reason for block: procedure for pain, at surgeon's request and post-op pain management  Staffing  Performed by: Marcus Leon MD  Authorized by: Marcus Leon MD    Preanesthetic Checklist  Completed: patient identified, IV checked, site marked, risks and benefits discussed, surgical consent, monitors and equipment checked, pre-op evaluation and timeout performed  Peripheral Block  Patient position: supine  Prep: ChloraPrep  Patient monitoring: continuous pulse oximetry and heart rate  Block type: Supraclavicular  Laterality: left  Injection technique: single-shot  Procedures: ultrasound guided, Ultrasound guidance required for the procedure to increase accuracy and safety of medication placement and decrease risk of complications.  Ultrasound permanent image saved  bupivacaine (PF) (MARCAINE) 0.5 % injection 20 mL - Perineural   5 mL - 4/24/2025 12:28:00 PM  bupivacaine liposomal (EXPAREL) 1.3 % injection 20 mL - Perineural   20 mL - 4/24/2025 12:28:00 PM  Needle  Needle type: Stimuplex   Needle gauge: 22 G  Needle length: 4 in  Needle localization: ultrasound guidance  Assessment  Injection assessment: frequent aspiration, incremental injection, needle tip visualized at all times, injected with ease, negative for heart rate change, negative aspiration, no paresthesia on injection and no symptoms of intraneural/intravenous injection  Paresthesia pain: none  Post-procedure:  site cleaned  patient tolerated the procedure well with no immediate complications

## 2025-04-24 NOTE — ANESTHESIA POSTPROCEDURE EVALUATION
Post-Op Assessment Note    CV Status:  Stable  Pain Score: 0    Pain management: adequate       Mental Status:  Awake   Hydration Status:  Stable   PONV Controlled:  None   Airway Patency:  Patent     Post Op Vitals Reviewed: Yes    No anethesia notable event occurred.    Staff: Anesthesiologist           Last Filed PACU Vitals:  Vitals Value Taken Time   Temp 97.6 °F (36.4 °C) 04/24/25 1412   Pulse 60 04/24/25 1412   /66 04/24/25 1412   Resp 20 04/24/25 1412   SpO2 96 % 04/24/25 1412       Modified Betito:     Vitals Value Taken Time   Activity 2 04/24/25 1412   Respiration 2 04/24/25 1412   Circulation 2 04/24/25 1412   Consciousness 2 04/24/25 1412   Oxygen Saturation 2 04/24/25 1412     Modified Betito Score: 10

## 2025-04-24 NOTE — DISCHARGE INSTR - AVS FIRST PAGE
POSTOPERATIVE INSTRUCTIONS following Elbow SURGERY  Distal Biceps RepairSurgery    MEDICATIONS:  Resume all home medications unless otherwise instructed by your surgeon.  Pain Medication:   Take Tylenol 1000mg three times a day and Naproxen 500mg twice daily on prescribed schedule for 7 days  Take 5mg Oxycodone as needed every 4-6 hours for severe pain   If you were given a regional anesthetic (nerve block), it is helpful to take your pain medication before the block wear off.    Possible side effects include nausea, constipation, and urinary retention.  If you experience these side effects, please call our office for assistance.  Pain med refills are authorized only during office hours (8am-4pm, Mon-Fri).  Nausea Medication:   Zofran 4mg, take 1 tablet every 6 hours as needed for nausea or vomiting   Fill prescription ONLY if you expericnce severe nausea.  Blood Clot Prevention:   Pump your foot up and down 20 times per hour while you are less mobile.  Ambulate with your crutches at least once every hour       WOUND CARE:  Keep the dressing clean and dry.  Light drainage may occur the first 2 days postop.  DO NOT REMOVE THE DRESSINGS until you are seen in our office.  Cover the bandages appropriately while washing to keep them from getting wet.  Please call our office ([phone]) if you experience either of the following:  Sudden increase in swelling, redness, or warmth at the surgical site  Excessive incisional drainage that persists beyond the 3rd day after surgery  Oral temperature greater than 101 degrees, not relieved with Tylenol  Shortness of breath, chest pain, nausea, or any other concerning symptoms  If it is after hours and you cannot reach someone, go to the nearest emergency room    ICE:  You may use Ice to help with pain control   Place ice on the operative site for 20 minutes at a time when you are in pain     SLING:  Wear your sling AT ALL TIMES (including sleep) until your first postoperative  office visit.  You may remove the sling for showering but must keep your arm at your side.    ACTIVITY:   DO NOT lift, carry, push, or pull anything with your operative arm.  No elbow range of motion at this time.  Should continue with finger range of motion.  Place a pillow behind the elbow while lying down.  Sleeping in a more upright position (recliner) may be more comfortable initially.  Elbow, Wrist, and Finger Motion:  You may take your elbow out of the slight carefully to move your elbow, wrist, and fingers. Follow your motion precautions for the shoulder. Replace the sling immediately after.     PHYSICAL THERAPY:  You will be given a physical therapy prescription when you are seen in the office for your postoperative appointment.    FOLLOW-UP APPOINTMENT:  2 weeks after surgery with:      Marco You MD    [facility] [address], [address], [address]  [place], [place] [address] [place] [id number]      Phone:   [phone]

## 2025-04-24 NOTE — INTERVAL H&P NOTE
H&P reviewed. After examining the patient I find no changes in the patients condition since the H&P had been written.    Vitals:    04/24/25 0957   BP: 107/70   Pulse: (!) 54   Resp: 16   Temp: 97.8 °F (36.6 °C)   SpO2: 100%   Cardiovascular:      Rate and Rhythm: Normal rate.      Pulses: Normal pulses.   Pulmonary:      Effort: Pulmonary effort is normal.   Musculoskeletal:         General: Normal range of motion.      Cervical back: Normal range of motion and neck supple.      Comments: See ortho exam        Abdominal: Soft nontender

## 2025-04-24 NOTE — NURSING NOTE
Pt discharged to HOME. Dressing remains CDI. Patient reports 0/10 pain; NUMBNESS. Discharge instructions reviewed with patient and pt expressed understanding (paper copy provided); spouse also present. Per pt, no additional questions or concerns at this time. Advised pt to contact Dr. You's office with any questions or concerns once  home. Patient taken to ride via wheelchair and was able to ambulate to vehicle w/o difficulty. Per pt, has all belongings.

## 2025-04-25 NOTE — OP NOTE
OPERATIVE REPORT  PATIENT NAME: Shyam Headley    :  1990  MRN: [id number]  Pt Location: [facility] OR ROOM 01    SURGERY DATE: 2025    Surgeons and Role:     * Marco You MD - Primary     * Radha Elizabeth PA-C - Assisting    Preop Diagnosis:  Rupture of left distal biceps tendon, initial encounter [S46.212A]    Post-Op Diagnosis Codes:     * Rupture of left distal biceps tendon, initial encounter [S46.212A]    Procedure(s):  Left - REPAIR TENDON BICEPS. Distal 46885    Specimen(s):  * No specimens in log *    Estimated Blood Loss:   Minimal    Drains:  * No LDAs found *    Anesthesia Type:   General w/ Regional    Operative Indications:  Rupture of left distal biceps tendon, initial encounter [S46.212A]  Shyam is a 35 y.o. with a left elbow injury where he was lifting something heavy and felt a pop in his elbow.  MRI of the left elbow was ordered and found to have a full-thickness complete retracted biceps tendon tear of about 7 cm.  We did discuss both operative and nonoperative treatments for biceps tendon tears.  We discussed without surgical invention he may have some chronic weakness with supination and somewhat flexion.  He is an active  and would require full strength to get back to work and do all of his activities.  He also exercises and is active with weightlifting.  After full discussion he would like to pursue operative intervention.  All questions were answered.   We discussed risks, benefits and alternatives to surgery previously. We discussed with the patient the risks of no treatment, non-operative treatment, and operative treatment. The risks of operative intervention were discussed and include but are not limited to: Infection, bleeding, stiffness, loss of range of motion, blood clot, failure of surgery, fracture, delayed union, nonunion, malunion, risk of potential future arthritis, continued problems with swelling, injury to surrounding  structures/nerve/artery/vein, recurrence of cysts, failure of hardware, retained hardware and/or foreign body, symptomatic hardware, and continued instability, pain, dysfunction, or disability despite repair.      Specifically, for      Arthroscopy with distal biceps tendon repair : Risk of Retear.  Risk of numbness in the lateral antebrachial cutaneous nerve distribution.  Risk of neurovascular injury.  Risk of heterotopic ossification.    Operative Findings:  Full-thickness complete retracted distal bicep tendon tear      Complications:   None          Implants: Arthrex distal bicep button    Anesthesia:General           Tourniquet time: No tourniquet used        Procedure Details   The patient was seen in the Holding Room. The risks, benefits, complications, treatment options, and expected outcomes were discussed with the patient. The risks and potential complications of their problem and purposed treatment including but not limited to failure to fully relieve pain, infection, neurovascular compromise, complications from anesthesia, stiff shoulder, and failure of surgery with continued pain.  The patient concurred with the proposed plan, giving informed consent.  The site of surgery properly noted/marked.  A preoperative supraclavicular nerve block was given.  The patient was taken to Operating Room, identified and the procedure verified as   Left elbow distal biceps tendon repair. A Time Out was held and the above information confirmed.    The patient was brought to the operating room, placed on the operating table in a supine position.   After administration of anesthesia and intravenous antibiotics 2 g Ancef, the patient was positioned on the Operating Room table in the supine position with a radiolucent arm board. The left arm and hand were prepped and draped the in the usual sterile fashion.     A transverse incision was made 4 cm distal to the anterior elbow crease.  The skin and subcuticular tissue were  dissected sharply.  The lateral antebrachial cutaneous nerve was identified and carefully retracted through the remainder of the case.     I then dissected proximally and identified the torn end of the distal biceps tendon.  I then sharply excised any portion of unhealthy tendon back to healthy tissue.  Next the Arthrex distal biceps cortical button was prepared after the FiberLoop suture tape was secured to the tendon with a running, locked whip stitch.      I then dissected down to the radial tuberosity.  The bursa was excised.  I then resected the scar tissue and then drilled with the guidewire.  Next, I reamed over the guide wire with a 8-mm reamer unicortically.  Fluoroscopy was used to ensure we are in the radial tuberosity in the midportion of it.  The biceps tendon was then prepared after excising the damaged portion of the tendon.   The button was then introduced through the  hole and flipped on the far cortex.  Next, the tendon was introduced into cortical socket and the suture tape was sutured back upon the tendon using a Chacko taper needle.  Fluoroscopy was used again to make sure that the button was sitting fleshly on the dorsal aspect of the radial cortex.  The tendon was brought through range of motion from 0-1 20 with no instability and no gapping.  At this time we proceeded to close.  No tourniquet was used during the case and hemostasis was maintained throughout the case with the use of bipolar.  The wound was copiously irrigated.  It was then closed in a layered fashion with 3-0 Vicryl and a running 3-0 Monocryl subcuticular stitch.  The skin glue and Steri-Strips were applied to the wound.      At the end of the case he did have good palpable pulses.  Sterile dressings and a well-padded posterior splint were applied.  Instrument, sponge, and needle counts were correct prior to closure and at the conclusion of the case.          I was present for the entire procedure., A qualified resident  physician was not available., and A physician assistant was required during the procedure for retraction, tissue handling, dissection and suturing.    Patient Disposition:  PACU              SIGNATURE: Marco You MD  DATE: April 25, 2025  TIME: 8:30 AM        Biceps Tendon Repair     Phase I ( 0-7 days post-op):   Posterior splint at 90 degrees of elbow flexion.   Wrist and hand gripping exercises.       Phase II (1 week through 5 weeks post-op):   Brace locked at 90 when not working on therapy exercises.   Elbow PROM and self assisted.   1 week post-op .   3 weeks post-op    5 weeks post-op    Shoulder Passive, Active Assisted, and Active Range of motion as tolerated all planes.   Scapular retraction and shoulder shrugs.   Active wrist flexion and extension.   Passive supination and pronation stretch.   No active elbow flexion or supination.   Gripping exercises.       Phase III (6 weeks post-op):   Begin active elbow flexion and supination at 6 weeks post-op.   Begin co-contraction exercises of biceps and triceps.   Elbow ROM.   Discontinue brace 8 weeks post-op (0-145).   8 weeks post-op begin   Light isotonic triceps.   Isotonic wrist flexors/extensors.   Shoulder rotator cuff and scapular strengthening.       Phase IV (12 weeks post-op):   Biceps light isotonics progressing 1 pound per week beginning at week 12 weeks post-op.   Initiate resisted UBE.       Phase V (16 weeks post-op through week 26):   Progressive strengthening as tolerated.   Functional progression to athletic activity.   Return to full participation in athletic activity at 5 months post-op.

## 2025-04-28 ENCOUNTER — TELEPHONE (OUTPATIENT)
Dept: OBGYN CLINIC | Facility: HOSPITAL | Age: 35
End: 2025-04-28

## 2025-04-28 NOTE — TELEPHONE ENCOUNTER
Caller: Shyam    Doctor: Dr. You / [place]    Reason for call: Patient had repair tendon bicep on 4/24.   Patient had missed call from ortho today with no message left on voice mail and no message in chart.      Please call patient to advise     Call back#: [phone]

## 2025-05-07 ENCOUNTER — OFFICE VISIT (OUTPATIENT)
Dept: OBGYN CLINIC | Facility: CLINIC | Age: 35
End: 2025-05-07

## 2025-05-07 VITALS — HEIGHT: 70 IN | WEIGHT: 201 LBS | BODY MASS INDEX: 28.77 KG/M2

## 2025-05-07 DIAGNOSIS — Z98.890 S/P TENDON REPAIR: Primary | ICD-10-CM

## 2025-05-07 PROCEDURE — 99024 POSTOP FOLLOW-UP VISIT: CPT | Performed by: ORTHOPAEDIC SURGERY

## 2025-05-07 NOTE — ASSESSMENT & PLAN NOTE
REPAIR TENDON BICEPS, Distal - Left on 4/24/2025.  Begin PT - referral and protocol provided  Elbow ROM brace to be worn at all times except for hygiene purposes  Continue OTC NSAIDs and Tylenol PRN for pain  Orders:  •  Elbow Rom Brace  •  Ambulatory Referral to Physical Therapy; Future

## 2025-05-07 NOTE — PROGRESS NOTES
Patient Name: Shyam Headley      : 1990       MRN: [id number]   Encounter Provider: Marco You MD   Encounter Date: 25  Encounter department: [facility] [place]         Assessment & Plan  S/P tendon repair  REPAIR TENDON BICEPS, Distal - Left on 2025.  Begin PT - referral and protocol provided  Elbow ROM brace to be worn at all times except for hygiene purposes  Continue OTC NSAIDs and Tylenol PRN for pain  Orders:  •  Elbow Rom Brace  •  Ambulatory Referral to Physical Therapy; Future       Plan:  Shyam is a 35 y.o. male who presents 2 weeks s/p REPAIR TENDON BICEPS, Distal - Left on 2025. He is doing well postoperatively.  Pain is well-controlled.  He is in a splint.  Planning to start physical therapy    Follow-up:  Return in about 4 weeks (around 2025) for Recheck in [place] office.     _____________________________________________________  CHIEF COMPLAINT:  Chief Complaint   Patient presents with   • Left Upper Arm - Post-op         SUBJECTIVE:  Shyam Headley is a 35 y.o. male who presents 2 weeks s/p REPAIR TENDON BICEPS, Distal - Left on 2025. He presents wearing the surgical dressing on the left elbow and simple sling. He states his pain is well controlled with naproxen and tylenol. He has not yet begun physical therapy. He denies any distal paresthesias of the LUE.    PAST MEDICAL HISTORY:  History reviewed. No pertinent past medical history.    PAST SURGICAL HISTORY:  Past Surgical History:   Procedure Laterality Date   • APPENDECTOMY     • DE RINSJ RPTD BICEPS/TRICEPS TDN DSTL W/WO TDN GRF Left 2025    Procedure: REPAIR TENDON BICEPS, Distal;  Surgeon: Marco You MD;  [facility];  Service: Orthopedics       FAMILY HISTORY:  Family History   Problem Relation Age of Onset   • No Known Problems Mother    • No Known Problems Father    • No Known Problems Sister    • No Known Problems Brother    • No Known  "Problems Maternal Aunt    • No Known Problems Maternal Uncle    • No Known Problems Paternal Aunt    • No Known Problems Paternal Uncle    • No Known Problems Maternal Grandmother    • No Known Problems Maternal Grandfather    • No Known Problems Paternal Grandmother    • No Known Problems Paternal Grandfather        SOCIAL HISTORY:  Social History     Tobacco Use   • Smoking status: Never   • Smokeless tobacco: Never   Vaping Use   • Vaping status: Never Used   Substance Use Topics   • Alcohol use: Not Currently     Comment: socially   • Drug use: Never       MEDICATIONS:    Current Outpatient Medications:   •  magnesium 30 MG tablet, Take 30 mg by mouth 2 (two) times a day, Disp: , Rfl:   •  Multiple Vitamin (multivitamin) capsule, Take 1 capsule by mouth daily, Disp: , Rfl:   •  naproxen (Naprosyn) 500 mg tablet, Take 1 tablet (500 mg total) by mouth 2 (two) times a day with meals, Disp: 60 tablet, Rfl: 0  •  Turmeric (QC TUMERIC COMPLEX PO), Take by mouth, Disp: , Rfl:   •  acetaminophen (TYLENOL) 500 mg tablet, Take 2 tablets (1,000 mg total) by mouth every 8 (eight) hours (Patient not taking: Reported on 5/7/2025), Disp: 60 tablet, Rfl: 0  •  ondansetron (ZOFRAN) 4 mg tablet, Take 1 tablet (4 mg total) by mouth every 8 (eight) hours as needed for nausea or vomiting (Patient not taking: Reported on 5/7/2025), Disp: 10 tablet, Rfl: 0    ALLERGIES:  No Known Allergies    LABS:  HgA1c: No results found for: \"HGBA1C\"  BMP: No results found for: \"GLUCOSE\", \"CALCIUM\", \"NA\", \"K\", \"CO2\", \"CL\", \"BUN\", \"CREATININE\"  CBC: No components found for: \"CBC\"    _____________________________________________________  Review of Systems   Constitutional:  Positive for activity change. Negative for chills and fever.   HENT:  Negative for ear pain and sore throat.    Eyes:  Negative for pain and visual disturbance.   Respiratory:  Negative for cough and shortness of breath.    Cardiovascular:  Negative for chest pain and " palpitations.   Gastrointestinal:  Negative for abdominal pain and vomiting.   Genitourinary:  Negative for dysuria and hematuria.   Musculoskeletal:  Positive for myalgias. Negative for arthralgias and back pain.   Skin:  Positive for wound (surgical wound left forearm). Negative for color change and rash.   Neurological:  Negative for seizures and syncope.   All other systems reviewed and are negative.       Left Elbow Exam     Tenderness   The patient is experiencing no tenderness.     Other   Erythema: absent  Scars: present (well approximated surgical wound)  Sensation: normal  Pulse: present    Comments:  Well-maintained ROM  Deferred strength due to postoperative state  Negative Hook test  Skin is warm and dry to touch with no signs of erythema, ecchymosis, or infection  2+ distal radial pulse with brisk capillary refill to the fingers  Radial, median, and ulnar motor and sensory distrubution intact  Sensation to light touch intact distally              Physical Exam  Vitals and nursing note reviewed.   Constitutional:       Appearance: Normal appearance.   HENT:      Head: Normocephalic and atraumatic.      Right Ear: External ear normal.      Left Ear: External ear normal.      Nose: Nose normal.   Eyes:      General: No scleral icterus.     Extraocular Movements: Extraocular movements intact.      Conjunctiva/sclera: Conjunctivae normal.   Cardiovascular:      Rate and Rhythm: Normal rate.   Pulmonary:      Effort: Pulmonary effort is normal. No respiratory distress.   Musculoskeletal:      Cervical back: Normal range of motion and neck supple.      Comments: See ortho exam   Skin:     General: Skin is warm and dry.   Neurological:      Mental Status: He is alert and oriented to person, place, and time.   Psychiatric:         Mood and Affect: Mood normal.         Behavior: Behavior normal.        _____________________________________________________  STUDIES REVIEWED:  No studies to review.      PROCEDURES  PERFORMED:  No procedures    Scribe Attestation    I,:  Cherelle Scales am acting as a scribe while in the presence of the attending physician.:       I,:  Marco You MD personally performed the services described in this documentation    as scribed in my presence.:

## 2025-05-14 ENCOUNTER — EVALUATION (OUTPATIENT)
Dept: PHYSICAL THERAPY | Facility: CLINIC | Age: 35
End: 2025-05-14
Attending: ORTHOPAEDIC SURGERY
Payer: COMMERCIAL

## 2025-05-14 DIAGNOSIS — S46.212A BICEPS MUSCLE TEAR, LEFT, INITIAL ENCOUNTER: Primary | ICD-10-CM

## 2025-05-14 DIAGNOSIS — T81.89XD POSTOPERATIVE TENDON RUPTURE, SUBSEQUENT ENCOUNTER: ICD-10-CM

## 2025-05-14 PROCEDURE — 97161 PT EVAL LOW COMPLEX 20 MIN: CPT

## 2025-05-14 PROCEDURE — 97535 SELF CARE MNGMENT TRAINING: CPT

## 2025-05-14 NOTE — PROGRESS NOTES
PT Evaluation     Today's date: 2025  Patient name: Shyam Headley  : 1990  MRN: [id number]  Referring provider: Marco You MD  Dx:   Encounter Diagnosis     ICD-10-CM    1. Biceps muscle tear, left, initial encounter  S46.212A       2. Postoperative tendon rupture, subsequent encounter  T81.89XD           Start Time: 0800  Stop Time: 0845  Total time in clinic (min): 45 minutes  Pt. arriving to clinic status with complaints of elbow tightness due to recent distal biceps repair pain. Impaired range of motion, strength, functional mobility and motor performance due to localized inflammation and pain.      Due to current deficits, patient is limited functionally. Patient has been educated in posture correction, proper seated ergonomics, joint protection strategies to help mitigate pain and reduce inflammation . Patient would benefit from skilled physical therapy services to address their aforementioned functional limitations and progress towards prior level of function and independence with home exercise program which patient is expected to achieves within 12-18 visits    Short term goals: 4 weeks -  - Initiate comprehensive HEP to move towards symptom reduction and return to function  - Improve postural awareness and shoulder mechanics to improve overhead activity  - Improve elbow and shoulder ROM to better tolerance with overhead activity and self care  - Patient to reduce pain to 2/10 at its worst to improve activity tolerance  - Patient to exhibit independence with home exercise program for pain management and ROM gains and     Long term goals: 8 weeks -12 weeks  - Patient to exhibit independence with home exercise program for advanced strengthening  - Patient to improve shoulder/elbow ROM to WFL and with minimal pain  - Improve gross shoulder /elbow strength to 5/5 to allow to return to heavy household activity and self care  -.Patient to reduce pain to 0/10 at its worst to improve QOL  and return to function  - Reaching behind back without pain for all dressing  - Patient to return to sleeping without pain   - FOTO to predicted score or better  - Reaching overhead to put dishes in cabinet with min to no compensatory shoulder hiking    5/14/2025 FOTO:   4   predicted:  53    Impairments:    restricted ROM    decreased strength   pain with function   activity intolerance   abnormal movement    Prognosis:  Excellent  Positive and negative prognostic indicator(s):  positive attitude about recovery and none    Planned interventions:  home exercise program, patient education, manual therapy, graded activity, flexibility, functional range of motion exercises, strengthening, and modalities prn joint protection, education on protocol, pain management education,     Duration in visits:  18  Frequency: 2 visits per week  Duration in weeks:  6-8  Plan of Care beginning date: 05/14/25  Plan of Care expiration date: 8 weeks - 7/9/2025  Treatment plan discussed with: Patient      History of Current Injury:  Injury to left arm while cutting tree branches on April 10th.  Surgery to distal biceps April 17th       Pain location: wrist tightness  Pain descriptors:  tightness  Pain intensity at worst 0  Pain Intensity at best 0        Aggravating factors/problems: Severe disability due to recent surgical procedure and currently in immobilization period, sleeping  Easing factors: medications, ice    Imaging: Prior to surgery MRI and x rays  Hobbies/Interests: finish tree work and yardwork, handywork  Occupation:  [employer]      OBJECTIVE:       MMT:    Shoulder MMT EVAL  EVAL Re-eval Re-eval Re-eval   Date 5/14/2025 5/14/2025      Side Involved side Uninvolved side  involved side       Shoulder flexion  2-/5 ** 5/5         Shoulder Abduction  2-/5 **  5/5         Shoulder Internal rotation   2-/5 **  5/5         Shoulder external rotation  2-/5 **  5/5         Elbow Flexion nt  5/5         Elbow extension  nt   5/5          Mid trapezius  nt/ 5  5/5         Lower trapezius  nt/ 5  5/5         Rhomboids  nt/ 5  5/5         Gross  nt /lbs  nt /lbs            Nt= not tested due to restrictions from recent surgery  2-/5 =  unable to perform AROM through antigravity positioning  ** = pain        ROM:    Shoulder AROM/PROM Evaluation Evaluation Re-evaluation Re-evaluation Re-evaluation    Involved uninvolved Involved side     Date 5/14/2025 5/14/2025         Shoulder flexion wnl wnl         Shoulder abduction wnl wnl         Shoulder External rotation wnl wnl         Shoulder Internal rotation wnl wnl          elbow flexion  110 passive  wnl          elbow extension  -45 passive  wnl                           EDEMA:  5/14/2025 :  Not present in left elbow and shoulder     PALPATION:  5/14/2025 normal      SPECIAL TESTS: are Not applicable due to recent surgical procedure and/or MD restrictions from protocol -     PRECAUTIONS: Follow Protocol as per MD orders  DATE OF SURGERY:  April 17th   4 weeks - may 15th  5 weeks - 5/22  8 weeks - 7/9/2025     Biceps Tendon Repair     Phase I ( 0-7 days post-op):   Posterior splint at 90 degrees of elbow flexion.   Wrist and hand gripping exercises.        Phase II (1 week through 5 weeks post-op):   Brace locked at 90 when not working on therapy exercises.   Elbow PROM and self assisted.   1 week post-op .   3 weeks post-op    5 weeks post-op    Shoulder Passive, Active Assisted, and Active Range of motion as tolerated all planes.   Scapular retraction and shoulder shrugs.   Active wrist flexion and extension.   Passive supination and pronation stretch.   No active elbow flexion or supination.   Gripping exercises.        Phase III (6 weeks post-op):   Begin active elbow flexion and supination at 6 weeks post-op.   Begin co-contraction exercises of biceps and triceps.   Elbow ROM.   Discontinue brace 8 weeks post-op (0-145).   8 weeks post-op begin   Light isotonic triceps.    Isotonic wrist flexors/extensors.   Shoulder rotator cuff and scapular strengthening.         Phase IV (12 weeks post-op):   Biceps light isotonics progressing 1 pound per week beginning at week 12 weeks post-op.   Initiate resisted UBE.         Phase V (16 weeks post-op through week 26):   Progressive strengthening as tolerated.   Functional progression to athletic activity.   Return to full participation in athletic activity at 5 months post-op.       Date          5/14/2025                                                                            Visits # EVAL  2 3  4  5  6    Manual                               PROM elbow in all directions within above protocol                                                        Neuro                scap squeeze  10x             Bent over row with scap squeeze  x10                                                                                               TE                pendulums  4 ways                table flexion  x10               elbow flexion/ext  passive by PT as above             Cervical ROM exercises         Gripping exercise                                         TA               g                                                                                                                                          Modalities                               Joint protection and  pain management education   x10             HEP education  Given, performed, instructed on HEP  x15               HEP:   Access Code: [email or web address]  URL: [email or web address]  Date: 05/14/2025  Prepared by: sImael Stephens    Exercises  - Seated Wrist Supination Stretch  - 1 x daily - 7 x weekly - 1 sets - 10 reps  - Wrist Pronation Stretch  - 1 x daily - 7 x weekly - 1 sets - 10 reps  - Seated Scapular Retraction  - 1 x daily - 7 x weekly - 3 sets - 10 reps  - Seated Shoulder Shrugs  - 1 x daily - 7 x weekly - 3 sets - 10 reps  - Bent Over Single Arm Shoulder Row with  Dumbbell  - 1 x daily - 7 x weekly - 1 sets - 10 reps  - Seated Shoulder Flexion Towel Slide at Table Top  - 1 x daily - 7 x weekly - 1 sets - 10 reps  - Seated Wrist Radial Deviation Stretch  - 1 x daily - 7 x weekly - 1 sets - 10 reps

## 2025-05-15 ENCOUNTER — OFFICE VISIT (OUTPATIENT)
Dept: PHYSICAL THERAPY | Facility: CLINIC | Age: 35
End: 2025-05-15
Attending: ORTHOPAEDIC SURGERY
Payer: COMMERCIAL

## 2025-05-15 DIAGNOSIS — T81.89XD POSTOPERATIVE TENDON RUPTURE, SUBSEQUENT ENCOUNTER: ICD-10-CM

## 2025-05-15 DIAGNOSIS — S46.212A BICEPS MUSCLE TEAR, LEFT, INITIAL ENCOUNTER: Primary | ICD-10-CM

## 2025-05-15 PROCEDURE — 97140 MANUAL THERAPY 1/> REGIONS: CPT

## 2025-05-15 PROCEDURE — 97110 THERAPEUTIC EXERCISES: CPT

## 2025-05-15 NOTE — PROGRESS NOTES
Daily Note     Today's date: 5/15/2025  Patient name: Shyam Headley  : 1990  MRN: [id number]  Referring provider: Marco You MD  Dx:   Encounter Diagnosis     ICD-10-CM    1. Biceps muscle tear, left, initial encounter  S46.212A       2. Postoperative tendon rupture, subsequent encounter  T81.89XD                      Subjective: doing well, nothing new to report and no new complaints to offer.  I am doing the HEP at home and no issues with that either        Objective: See treatment diary below      Assessment: Tolerated treatment well. Patient demonstrated fatigue post treatment and would benefit from continued PT      Plan: Continue per plan of care.         PRECAUTIONS: Follow Protocol as per MD orders  DATE OF SURGERY:     4 weeks - may 15th  5 weeks -   8 weeks - 2025     Biceps Tendon Repair     Phase I ( 0-7 days post-op):   Posterior splint at 90 degrees of elbow flexion.   Wrist and hand gripping exercises.        Phase II (1 week through 5 weeks post-op):   Brace locked at 90 when not working on therapy exercises.   Elbow PROM and self assisted.   1 week post-op .   3 weeks post-op    5 weeks post-op    Shoulder Passive, Active Assisted, and Active Range of motion as tolerated all planes.   Scapular retraction and shoulder shrugs.   Active wrist flexion and extension.   Passive supination and pronation stretch.   No active elbow flexion or supination.   Gripping exercises.        Phase III (6 weeks post-op):   Begin active elbow flexion and supination at 6 weeks post-op.   Begin co-contraction exercises of biceps and triceps.   Elbow ROM.   Discontinue brace 8 weeks post-op (0-145).   8 weeks post-op begin   Light isotonic triceps.   Isotonic wrist flexors/extensors.   Shoulder rotator cuff and scapular strengthening.         Phase IV (12 weeks post-op):   Biceps light isotonics progressing 1 pound per week beginning at week 12 weeks post-op.    Initiate resisted UBE.         Phase V (16 weeks post-op through week 26):   Progressive strengthening as tolerated.   Functional progression to athletic activity.   Return to full participation in athletic activity at 5 months post-op.       Date          5/14/2025         5/15/25                                                                   Visits # EVAL  2 3  4  5  6    Manual                               PROM elbow in all directions within above protocol    delaney                                                            Neuro                scap squeeze  Shoulder shrugs  10x  10x X10  x10            Bent over row with scap squeeze  x10 ea  x10            shoulder raises  Flex  abd  x 10 ea   x10                                                                                                            TE                               table flexion  x10               elbow flexion/ext  passive by PT as above  delaney           Cervical ROM exercises               Gripping exercise    50# x 30            wrist flexion/ext    1# X 20EA                           TA               g                                                                                                                                                               Modalities                               Joint protection and  pain management education   x10             HEP education  Given, performed, instructed on HEP  x15                HEP:   Access Code: [email or web address]  URL: [email or web address]  Date: 05/14/2025  Prepared by: Ismael Stephens     Exercises  - Seated Wrist Supination Stretch  - 1 x daily - 7 x weekly - 1 sets - 10 reps  - Wrist Pronation Stretch  - 1 x daily - 7 x weekly - 1 sets - 10 reps  - Seated Scapular Retraction  - 1 x daily - 7 x weekly - 3 sets - 10 reps  - Seated Shoulder Shrugs  - 1 x daily - 7 x weekly - 3 sets - 10 reps  - Bent Over Single Arm Shoulder Row with Dumbbell  - 1 x daily - 7 x weekly - 1 sets -  10 reps  - Seated Shoulder Flexion Towel Slide at Table Top  - 1 x daily - 7 x weekly - 1 sets - 10 reps  - Seated Wrist Radial Deviation Stretch  - 1 x daily - 7 x weekly - 1 sets - 10 reps

## 2025-05-20 ENCOUNTER — OFFICE VISIT (OUTPATIENT)
Dept: PHYSICAL THERAPY | Facility: CLINIC | Age: 35
End: 2025-05-20
Attending: ORTHOPAEDIC SURGERY
Payer: COMMERCIAL

## 2025-05-20 DIAGNOSIS — T81.89XD POSTOPERATIVE TENDON RUPTURE, SUBSEQUENT ENCOUNTER: ICD-10-CM

## 2025-05-20 DIAGNOSIS — S46.212A BICEPS MUSCLE TEAR, LEFT, INITIAL ENCOUNTER: Primary | ICD-10-CM

## 2025-05-20 PROCEDURE — 97110 THERAPEUTIC EXERCISES: CPT

## 2025-05-20 PROCEDURE — 97140 MANUAL THERAPY 1/> REGIONS: CPT

## 2025-05-20 NOTE — PROGRESS NOTES
Daily Note     Today's date: 2025  Patient name: Shyam Headley  : 1990  MRN: [id number]  Referring provider: Marco You MD  Dx:   Encounter Diagnosis     ICD-10-CM    1. Biceps muscle tear, left, initial encounter  S46.212A       2. Postoperative tendon rupture, subsequent encounter  T81.89XD                      Subjective: doing very well and not taking NSAids as mucha nymore and only using ice which helps      Objective: See treatment diary below      Assessment: Tolerated treatment well. Patient demonstrated fatigue post treatment and would benefit from continued PT  Good supination passively but 50% pronation noted passively    Plan: Continue per plan of care.         PRECAUTIONS: Follow Protocol as per MD orders  DATE OF SURGERY:     4 weeks - may 15th  5 weeks -   8 weeks - 2025     Biceps Tendon Repair     Phase I ( 0-7 days post-op):   Posterior splint at 90 degrees of elbow flexion.   Wrist and hand gripping exercises.        Phase II (1 week through 5 weeks post-op):   Brace locked at 90 when not working on therapy exercises.   Elbow PROM and self assisted.   1 week post-op .   3 weeks post-op    5 weeks post-op    Shoulder Passive, Active Assisted, and Active Range of motion as tolerated all planes.   Scapular retraction and shoulder shrugs.   Active wrist flexion and extension.   Passive supination and pronation stretch.   No active elbow flexion or supination.   Gripping exercises.        Phase III (6 weeks post-op):   Begin active elbow flexion and supination at 6 weeks post-op.   Begin co-contraction exercises of biceps and triceps.   Elbow ROM.   Discontinue brace 8 weeks post-op (0-145).   8 weeks post-op begin   Light isotonic triceps.   Isotonic wrist flexors/extensors.   Shoulder rotator cuff and scapular strengthening.         Phase IV (12 weeks post-op):   Biceps light isotonics progressing 1 pound per week beginning at week 12  weeks post-op.   Initiate resisted UBE.         Phase V (16 weeks post-op through week 26):   Progressive strengthening as tolerated.   Functional progression to athletic activity.   Return to full participation in athletic activity at 5 months post-op.      Date of Surgery 4-17-25  5/15 4 weeks  5/22 5 weeks  5/29 6 weeks  6/12 8 weeks     Date          5/14/2025         5/15/25                                                                   Visits # EVAL  2 3  4  5  6    Manual                               PROM elbow in all directions within above protocol    delaney                                                            Neuro                scap squeeze  Shoulder shrugs  10x  10x X10  x10            Bent over row with scap squeeze  x10 ea  x10            shoulder raises  Flex  abd  x 10 ea   x10                                                                                                            TE                               table flexion  x10   FIS             elbow flexion/ext  passive by PT as above  delaney           Cervical ROM exercises    no need           Gripping exercise    50# x 30            wrist flexion/ext    1# X 20EA                           TA               g                                                                                                                                                               Modalities                               Joint protection and  pain management education   x10             HEP education  Given, performed, instructed on HEP  x15                HEP:   Access Code: [email or web address]  URL: [email or web address]  Date: 05/14/2025  Prepared by: Ismael Stephens     Exercises  - Seated Wrist Supination Stretch  - 1 x daily - 7 x weekly - 1 sets - 10 reps  - Wrist Pronation Stretch  - 1 x daily - 7 x weekly - 1 sets - 10 reps  - Seated Scapular Retraction  - 1 x daily - 7 x weekly - 3 sets - 10 reps  - Seated Shoulder Shrugs  - 1 x daily - 7 x  weekly - 3 sets - 10 reps  - Bent Over Single Arm Shoulder Row with Dumbbell  - 1 x daily - 7 x weekly - 1 sets - 10 reps  - Seated Shoulder Flexion Towel Slide at Table Top  - 1 x daily - 7 x weekly - 1 sets - 10 reps  - Seated Wrist Radial Deviation Stretch  - 1 x daily - 7 x weekly - 1 sets - 10 reps

## 2025-05-23 ENCOUNTER — OFFICE VISIT (OUTPATIENT)
Dept: PHYSICAL THERAPY | Facility: CLINIC | Age: 35
End: 2025-05-23
Attending: ORTHOPAEDIC SURGERY
Payer: COMMERCIAL

## 2025-05-23 DIAGNOSIS — S46.212A BICEPS MUSCLE TEAR, LEFT, INITIAL ENCOUNTER: Primary | ICD-10-CM

## 2025-05-23 DIAGNOSIS — T81.89XD POSTOPERATIVE TENDON RUPTURE, SUBSEQUENT ENCOUNTER: ICD-10-CM

## 2025-05-23 PROCEDURE — 97140 MANUAL THERAPY 1/> REGIONS: CPT

## 2025-05-23 PROCEDURE — 97110 THERAPEUTIC EXERCISES: CPT

## 2025-05-23 NOTE — PROGRESS NOTES
Daily Note     Today's date: 2025  Patient name: Shyam Headley  : 1990  MRN: [id number]  Referring provider: Marco You MD  Dx:   Encounter Diagnosis     ICD-10-CM    1. Biceps muscle tear, left, initial encounter  S46.212A       2. Postoperative tendon rupture, subsequent encounter  T81.89XD                      Subjective: no issues       Objective: See treatment diary below      Assessment: Tolerated treatment well. Patient demonstrated fatigue post treatment and would benefit from continued PT      Plan: Continue per plan of care.         PRECAUTIONS: Follow Protocol as per MD orders  DATE OF SURGERY:     4 weeks - may 15th  5 weeks -   8 weeks - 2025     Biceps Tendon Repair     Phase I ( 0-7 days post-op):   Posterior splint at 90 degrees of elbow flexion.   Wrist and hand gripping exercises.        Phase II (1 week through 5 weeks post-op):   Brace locked at 90 when not working on therapy exercises.   Elbow PROM and self assisted.   1 week post-op .   3 weeks post-op    5 weeks post-op    Shoulder Passive, Active Assisted, and Active Range of motion as tolerated all planes.   Scapular retraction and shoulder shrugs.   Active wrist flexion and extension.   Passive supination and pronation stretch.   No active elbow flexion or supination.   Gripping exercises.        Phase III (6 weeks post-op):   Begin active elbow flexion and supination at 6 weeks post-op.   Begin co-contraction exercises of biceps and triceps.   Elbow ROM.   Discontinue brace 8 weeks post-op (0-145).   8 weeks post-op begin   Light isotonic triceps.   Isotonic wrist flexors/extensors.   Shoulder rotator cuff and scapular strengthening.         Phase IV (12 weeks post-op):   Biceps light isotonics progressing 1 pound per week beginning at week 12 weeks post-op.   Initiate resisted UBE.         Phase V (16 weeks post-op through week 26):   Progressive strengthening as tolerated.    Functional progression to athletic activity.   Return to full participation in athletic activity at 5 months post-op.      Date of Surgery 4-17-25  5/15 4 weeks  5/22 5 weeks  5/29 6 weeks  6/12 8 weeks     Date          5/14/2025         5/15/25              5/21/25                5/23/25                                     Visits # EVAL  2 3  4  5  6    Manual                               PROM elbow in all directions within above protocol    delaney   delaney  delaney                                                       Neuro                scap squeeze  Shoulder shrugs  10x  10x X10  x10   x10 x10ea        Bent over row with scap squeeze  x10 ea  x10 x15  x15         shoulder raises  Flex  abd  x 10 ea   x10   x15  x15                                                                                                       TE                               table flexion  x10   FIS   fis Fis x 10         elbow flexion/ext  passive by PT as above  delaney  delaney delaney        Cervical ROM exercises    no need           Gripping exercise    50# x 30  50# 70#         wrist flexion/ext    1# X 20EA  1# x 20 1#x 20                        TA               g                                                                                                                                                               Modalities                               Joint protection and  pain management education   x10             HEP education  Given, performed, instructed on HEP  x15                HEP:   Access Code: [email or web address]  URL: [email or web address]  Date: 05/14/2025  Prepared by: Ismael Stephens     Exercises  - Seated Wrist Supination Stretch  - 1 x daily - 7 x weekly - 1 sets - 10 reps  - Wrist Pronation Stretch  - 1 x daily - 7 x weekly - 1 sets - 10 reps  - Seated Scapular Retraction  - 1 x daily - 7 x weekly - 3 sets - 10 reps  - Seated Shoulder Shrugs  - 1 x daily - 7 x weekly - 3 sets - 10 reps  - Bent Over Single Arm Shoulder Row  with Dumbbell  - 1 x daily - 7 x weekly - 1 sets - 10 reps  - Seated Shoulder Flexion Towel Slide at Table Top  - 1 x daily - 7 x weekly - 1 sets - 10 reps  - Seated Wrist Radial Deviation Stretch  - 1 x daily - 7 x weekly - 1 sets - 10 reps

## 2025-05-23 NOTE — PROGRESS NOTES
Daily Note     Today's date: 2025  Patient name: Shyam Headley  : 1990  MRN: [id number]  Referring provider: Marco You MD  Dx:   Encounter Diagnosis     ICD-10-CM    1. Biceps muscle tear, left, initial encounter  S46.212A       2. Postoperative tendon rupture, subsequent encounter  T81.89XD                      Subjective: really well no pain only stiffness with pronation stretch      Objective: See treatment diary below      Assessment: Tolerated treatment well. Patient demonstrated fatigue post treatment and would benefit from continued PT      Plan: Continue per plan of care.         PRECAUTIONS: Follow Protocol as per MD orders  DATE OF SURGERY:     4 weeks - may 15th  5 weeks -   8 weeks - 2025     Biceps Tendon Repair     Phase I ( 0-7 days post-op):   Posterior splint at 90 degrees of elbow flexion.   Wrist and hand gripping exercises.        Phase II (1 week through 5 weeks post-op):   Brace locked at 90 when not working on therapy exercises.   Elbow PROM and self assisted.   1 week post-op .   3 weeks post-op    5 weeks post-op    Shoulder Passive, Active Assisted, and Active Range of motion as tolerated all planes.   Scapular retraction and shoulder shrugs.   Active wrist flexion and extension.   Passive supination and pronation stretch.   No active elbow flexion or supination.   Gripping exercises.        Phase III (6 weeks post-op):   Begin active elbow flexion and supination at 6 weeks post-op.   Begin co-contraction exercises of biceps and triceps.   Elbow ROM.   Discontinue brace 8 weeks post-op (0-145).   8 weeks post-op begin   Light isotonic triceps.   Isotonic wrist flexors/extensors.   Shoulder rotator cuff and scapular strengthening.         Phase IV (12 weeks post-op):   Biceps light isotonics progressing 1 pound per week beginning at week 12 weeks post-op.   Initiate resisted UBE.         Phase V (16 weeks post-op through week  26):   Progressive strengthening as tolerated.   Functional progression to athletic activity.   Return to full participation in athletic activity at 5 months post-op.      Date of Surgery 4-17-25  5/15 4 weeks  5/22 5 weeks  5/29 6 weeks  6/12 8 weeks     Date          5/14/2025         5/15/25              5/2 5/23/25 5/27/25                          Visits # EVAL  2 3  4  5  6    Manual                               PROM elbow in all directions within above protocol    delaney   - -                                                        Neuro                scap squeeze  Shoulder shrugs  10x  10x X10  x10  X10  x10  X10  x10        Bent over row with scap squeeze  x10 ea  x10  x10  X10 2#          shoulder raises  Flex  abd  x 10 ea   x10   x10 X20 2#  2# x                                                                                                       TE                               table flexion  x10   FIS   fis  Fis fis       elbow flexion/ext  passive by PT as above  delaney  delaney  delaney  delaney      Cervical ROM exercises    no need  -         Gripping exercise    50# x 30  50# 70#  70#       wrist flexion/ext    1# X 20EA  1# x 20  1# x 20 2# x 20                      TA               g                                                                                                                                                               Modalities                               Joint protection and  pain management education   x10             HEP education  Given, performed, instructed on HEP  x15                HEP:   Access Code: [email or web address]  URL: [email or web address]  Date: 05/14/2025  Prepared by: Ismael Stephens     Exercises  - Seated Wrist Supination Stretch  - 1 x daily - 7 x weekly - 1 sets - 10 reps  - Wrist Pronation Stretch  - 1 x daily - 7 x weekly - 1 sets - 10 reps  - Seated Scapular Retraction  - 1 x daily - 7 x weekly - 3 sets - 10 reps  - Seated  Shoulder Shrugs  - 1 x daily - 7 x weekly - 3 sets - 10 reps  - Bent Over Single Arm Shoulder Row with Dumbbell  - 1 x daily - 7 x weekly - 1 sets - 10 reps  - Seated Shoulder Flexion Towel Slide at Table Top  - 1 x daily - 7 x weekly - 1 sets - 10 reps  - Seated Wrist Radial Deviation Stretch  - 1 x daily - 7 x weekly - 1 sets - 10 reps

## 2025-05-27 ENCOUNTER — OFFICE VISIT (OUTPATIENT)
Dept: PHYSICAL THERAPY | Facility: CLINIC | Age: 35
End: 2025-05-27
Attending: ORTHOPAEDIC SURGERY
Payer: COMMERCIAL

## 2025-05-27 DIAGNOSIS — T81.89XD POSTOPERATIVE TENDON RUPTURE, SUBSEQUENT ENCOUNTER: ICD-10-CM

## 2025-05-27 DIAGNOSIS — S46.212A BICEPS MUSCLE TEAR, LEFT, INITIAL ENCOUNTER: Primary | ICD-10-CM

## 2025-05-27 PROCEDURE — 97110 THERAPEUTIC EXERCISES: CPT

## 2025-05-27 PROCEDURE — 97140 MANUAL THERAPY 1/> REGIONS: CPT

## 2025-05-30 ENCOUNTER — OFFICE VISIT (OUTPATIENT)
Dept: PHYSICAL THERAPY | Facility: CLINIC | Age: 35
End: 2025-05-30
Attending: ORTHOPAEDIC SURGERY
Payer: COMMERCIAL

## 2025-05-30 DIAGNOSIS — T81.89XD POSTOPERATIVE TENDON RUPTURE, SUBSEQUENT ENCOUNTER: ICD-10-CM

## 2025-05-30 DIAGNOSIS — S46.212A BICEPS MUSCLE TEAR, LEFT, INITIAL ENCOUNTER: Primary | ICD-10-CM

## 2025-05-30 PROCEDURE — 97140 MANUAL THERAPY 1/> REGIONS: CPT

## 2025-05-30 PROCEDURE — 97110 THERAPEUTIC EXERCISES: CPT

## 2025-05-30 PROCEDURE — 97535 SELF CARE MNGMENT TRAINING: CPT

## 2025-05-30 NOTE — PROGRESS NOTES
Daily Note     Today's date: 2025  Patient name: Shyam Headley  : 1990  MRN: [id number]  Referring provider: Marco You MD  Dx:   Encounter Diagnosis     ICD-10-CM    1. Biceps muscle tear, left, initial encounter  S46.212A       2. Postoperative tendon rupture, subsequent encounter  T81.89XD                      Subjective: feeling good no pain at all       Objective: See treatment diary below      Assessment: Tolerated treatment well. Patient exhibited good technique with therapeutic exercises and would benefit from continued PT  Advanced as per 6 week protocol.      Plan: Continue per plan of care.         PRECAUTIONS: Follow Protocol as per MD orders  DATE OF SURGERY:     4 weeks - may 15th  5 weeks -   8 weeks - 2025     Biceps Tendon Repair     Phase I ( 0-7 days post-op):   Posterior splint at 90 degrees of elbow flexion.   Wrist and hand gripping exercises.        Phase II (1 week through 5 weeks post-op):   Brace locked at 90 when not working on therapy exercises.   Elbow PROM and self assisted.   1 week post-op .   3 weeks post-op    5 weeks post-op    Shoulder Passive, Active Assisted, and Active Range of motion as tolerated all planes.   Scapular retraction and shoulder shrugs.   Active wrist flexion and extension.   Passive supination and pronation stretch.   No active elbow flexion or supination.   Gripping exercises.        Phase III (6 weeks post-op):   Begin active elbow flexion and supination at 6 weeks post-op.   Begin co-contraction exercises of biceps and triceps.   Elbow ROM.   Discontinue brace 8 weeks post-op (0-145).   8 weeks post-op begin   Light isotonic triceps.   Isotonic wrist flexors/extensors.   Shoulder rotator cuff and scapular strengthening.         Phase IV (12 weeks post-op):   Biceps light isotonics progressing 1 pound per week beginning at week 12 weeks post-op.   Initiate resisted UBE.         Phase V (16 weeks  "post-op through week 26):   Progressive strengthening as tolerated.   Functional progression to athletic activity.   Return to full participation in athletic activity at 5 months post-op.      Date of Surgery 4-17-25  5/15 4 weeks  5/22 5 weeks  5/29 6 weeks  6/12 8 weeks     Date          5/14/2025         5/15/25              5/2 5/23/25 5/27/25 5/30/25          Visits # EVAL  2 3  4  5  6    Manual                               PROM elbow in all directions within above protocol    delaney   - -  0-145    0-145                                                    Neuro                scap squeeze  Shoulder shrugs  10x  10x X10  x10  X10  x10  X10  x10   x10  10  x10ea   Bent over row with scap squeeze  x10 ea  x10  x10  X10 2#    2# x 20 2#x     shoulder raises  Flex  abd  x 10 ea   x10   x10 X20 2#  2# x        tricep extension isometric         5\" x 10       wrist pron/sup         0# x 10       co-contraction at 90dg         x10       elbow flexion/ext         0# x 10                                      TE                               table flexion  x10   FIS   fis  Fis fis       elbow flexion/ext  passive by PT as above  delaney  delaney  delaney  delaney      Cervical ROM exercises    no need  -         Gripping exercise    50# x 30  50# 70#  70#       wrist flexion/ext    1# X 20EA  1# x 20  1# x 20 2# x 20                      TA               g                                                                                                                                                               Modalities                               Joint protection and  pain management education   x10             HEP education  Given, performed, instructed on HEP  x15                Access Code: [email or web address]  URL: [email or web address]  Date: 05/30/2025  Prepared by: Ismael Stephens    Exercises  - Seated Isometric Elbow Extension  - 1 x daily - 3 x weekly - 1 sets - 10 reps - 2 " hold  - Seated Forearm Pronation and Supination AROM  - 1 x daily - 3 x weekly - 1 sets - 10 reps - 2 hold  - Seated Elbow Flexion with Self-Anchored Resistance  - 1 x daily - 3 x weekly - 1 sets - 10 reps - 2 hold    HEP:   Access Code: [email or web address]  URL: [email or web address]  Date: 05/14/2025  Prepared by: Ismael Stephens     Exercises  - Seated Wrist Supination Stretch  - 1 x daily - 7 x weekly - 1 sets - 10 reps  - Wrist Pronation Stretch  - 1 x daily - 7 x weekly - 1 sets - 10 reps  - Seated Scapular Retraction  - 1 x daily - 7 x weekly - 3 sets - 10 reps  - Seated Shoulder Shrugs  - 1 x daily - 7 x weekly - 3 sets - 10 reps  - Bent Over Single Arm Shoulder Row with Dumbbell  - 1 x daily - 7 x weekly - 1 sets - 10 reps  - Seated Shoulder Flexion Towel Slide at Table Top  - 1 x daily - 7 x weekly - 1 sets - 10 reps  - Seated Wrist Radial Deviation Stretch  - 1 x daily - 7 x weekly - 1 sets - 10 reps

## 2025-06-03 ENCOUNTER — OFFICE VISIT (OUTPATIENT)
Dept: PHYSICAL THERAPY | Facility: CLINIC | Age: 35
End: 2025-06-03
Attending: ORTHOPAEDIC SURGERY
Payer: COMMERCIAL

## 2025-06-03 DIAGNOSIS — S46.212A BICEPS MUSCLE TEAR, LEFT, INITIAL ENCOUNTER: Primary | ICD-10-CM

## 2025-06-03 DIAGNOSIS — T81.89XD POSTOPERATIVE TENDON RUPTURE, SUBSEQUENT ENCOUNTER: ICD-10-CM

## 2025-06-03 PROCEDURE — 97535 SELF CARE MNGMENT TRAINING: CPT

## 2025-06-03 PROCEDURE — 97110 THERAPEUTIC EXERCISES: CPT

## 2025-06-03 PROCEDURE — 97140 MANUAL THERAPY 1/> REGIONS: CPT

## 2025-06-03 NOTE — PROGRESS NOTES
Daily Note     Today's date: 6/3/2025  Patient name: Shyam Headley  : 1990  MRN: [id number]  Referring provider: Marco You MD  Dx:   Encounter Diagnosis     ICD-10-CM    1. Biceps muscle tear, left, initial encounter  S46.212A       2. Postoperative tendon rupture, subsequent encounter  T81.89XD                      Subjective: doing well. The steri strips fell off      Objective: See treatment diary below      Assessment: Tolerated treatment well. Patient demonstrated fatigue post treatment, exhibited good technique with therapeutic exercises, and would benefit from continued PT. Elbow flexion nearing thumb to shoulder  and -13 elbow ext     Plan: Continue per plan of care.         PRECAUTIONS: Follow Protocol as per MD orders  DATE OF SURGERY:     4 weeks - may 15th  5 weeks -   8 weeks - 2025     Biceps Tendon Repair     Phase I ( 0-7 days post-op):   Posterior splint at 90 degrees of elbow flexion.   Wrist and hand gripping exercises.        Phase II (1 week through 5 weeks post-op):   Brace locked at 90 when not working on therapy exercises.   Elbow PROM and self assisted.   1 week post-op .   3 weeks post-op    5 weeks post-op    Shoulder Passive, Active Assisted, and Active Range of motion as tolerated all planes.   Scapular retraction and shoulder shrugs.   Active wrist flexion and extension.   Passive supination and pronation stretch.   No active elbow flexion or supination.   Gripping exercises.        Phase III (6 weeks post-op):   Begin active elbow flexion and supination at 6 weeks post-op.   Begin co-contraction exercises of biceps and triceps.   Elbow ROM.   Discontinue brace 8 weeks post-op (0-145).   8 weeks post-op begin   Light isotonic triceps.   Isotonic wrist flexors/extensors.   Shoulder rotator cuff and scapular strengthening.         Phase IV (12 weeks post-op):   Biceps light isotonics progressing 1 pound per week beginning at week  "12 weeks post-op.   Initiate resisted UBE.         Phase V (16 weeks post-op through week 26):   Progressive strengthening as tolerated.   Functional progression to athletic activity.   Return to full participation in athletic activity at 5 months post-op.      Date of Surgery 4-17-25  5/15 4 weeks  5/22 5 weeks  5/29 6 weeks  6/12 8 weeks     Date          6/3/25         5/15/25              5/2                5/23/25           5/27/25                5/30/25          Visits # EVAL  2 3  4  5  6    Manual                               PROM elbow in all directions within above protocol   delaney delaney   - -  0-145    0-145                                                    Neuro                scap squeeze  Shoulder shrugs  10x  10x X10  x10  X10  x10  X10  x10   x10  10  x10ea   Bent over row with scap squeeze 3# x10 ea  x10  x10  X10 2#    2# x 20 2#x     shoulder raises  Flex  abd 3# x 10 ea   x10   x10 X20 2#  2# x    3# x20    tricep extension isometric         5\" x 10   5# x10    wrist pron/sup         0# x 10       co-contraction at 90dg         x10   x10    elbow flexion/ext         0# x 10  1 x 10  pron  Sup  neutral                                    TE                               table flexion  x10   FIS   fis  Fis fis       elbow flexion/ext  passive by PT as above  delaney  delaney  delaney  delaney      Cervical ROM exercises    no need  -         Gripping exercise    50# x 30  50# 70#  70#       wrist flexion/ext    1# X 20EA  1# x 20  1# x 20 2# x 20                      TA               g                                                                                                                                                               Modalities                               Joint protection and  pain management education   x10             HEP education  Given, performed, instructed on HEP  x15                Access Code: [email or web address]  URL: [email or web address]  Date: 05/30/2025  Prepared by: " Ismael Stephens    Exercises  - Seated Isometric Elbow Extension  - 1 x daily - 3 x weekly - 1 sets - 10 reps - 2 hold  - Seated Forearm Pronation and Supination AROM  - 1 x daily - 3 x weekly - 1 sets - 10 reps - 2 hold  - Seated Elbow Flexion with Self-Anchored Resistance  - 1 x daily - 3 x weekly - 1 sets - 10 reps - 2 hold    HEP:   Access Code: [email or web address]  URL: [email or web address]  Date: 05/14/2025  Prepared by: Ismael Stephens     Exercises  - Seated Wrist Supination Stretch  - 1 x daily - 7 x weekly - 1 sets - 10 reps  - Wrist Pronation Stretch  - 1 x daily - 7 x weekly - 1 sets - 10 reps  - Seated Scapular Retraction  - 1 x daily - 7 x weekly - 3 sets - 10 reps  - Seated Shoulder Shrugs  - 1 x daily - 7 x weekly - 3 sets - 10 reps  - Bent Over Single Arm Shoulder Row with Dumbbell  - 1 x daily - 7 x weekly - 1 sets - 10 reps  - Seated Shoulder Flexion Towel Slide at Table Top  - 1 x daily - 7 x weekly - 1 sets - 10 reps  - Seated Wrist Radial Deviation Stretch  - 1 x daily - 7 x weekly - 1 sets - 10 reps

## 2025-06-05 ENCOUNTER — OFFICE VISIT (OUTPATIENT)
Dept: PHYSICAL THERAPY | Facility: CLINIC | Age: 35
End: 2025-06-05
Attending: ORTHOPAEDIC SURGERY
Payer: COMMERCIAL

## 2025-06-05 DIAGNOSIS — S46.212A BICEPS MUSCLE TEAR, LEFT, INITIAL ENCOUNTER: Primary | ICD-10-CM

## 2025-06-05 DIAGNOSIS — T81.89XD POSTOPERATIVE TENDON RUPTURE, SUBSEQUENT ENCOUNTER: ICD-10-CM

## 2025-06-05 PROCEDURE — 97110 THERAPEUTIC EXERCISES: CPT

## 2025-06-05 PROCEDURE — 97140 MANUAL THERAPY 1/> REGIONS: CPT

## 2025-06-05 NOTE — PROGRESS NOTES
Daily Note     Today's date: 2025  Patient name: Shyam Headley  : 1990  MRN: [id number]  Referring provider: Marco You MD  Dx:   Encounter Diagnosis     ICD-10-CM    1. Biceps muscle tear, left, initial encounter  S46.212A       2. 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 Patient demonstrated fatigue post treatment and would benefit from continued PT- good improvement in wrist passive ROM.       Plan: Continue per plan of care.         PRECAUTIONS: Follow Protocol as per MD orders  DATE OF SURGERY:     4 weeks - may 15th  5 weeks -   8 weeks - 2025     Biceps Tendon Repair     Phase I ( 0-7 days post-op):   Posterior splint at 90 degrees of elbow flexion.   Wrist and hand gripping exercises.        Phase II (1 week through 5 weeks post-op):   Brace locked at 90 when not working on therapy exercises.   Elbow PROM and self assisted.   1 week post-op .   3 weeks post-op    5 weeks post-op    Shoulder Passive, Active Assisted, and Active Range of motion as tolerated all planes.   Scapular retraction and shoulder shrugs.   Active wrist flexion and extension.   Passive supination and pronation stretch.   No active elbow flexion or supination.   Gripping exercises.        Phase III (6 weeks post-op):   Begin active elbow flexion and supination at 6 weeks post-op.   Begin co-contraction exercises of biceps and triceps.   Elbow ROM.   Discontinue brace 8 weeks post-op (0-145).   8 weeks post-op begin   Light isotonic triceps.   Isotonic wrist flexors/extensors.   Shoulder rotator cuff and scapular strengthening.         Phase IV (12 weeks post-op):   Biceps light isotonics progressing 1 pound per week beginning at week 12 weeks post-op.   Initiate resisted UBE.         Phase V (16 weeks post-op  "through week 26):   Progressive strengthening as tolerated.   Functional progression to athletic activity.   Return to full participation in athletic activity at 5 months post-op.      Date of Surgery 4-17-25  5/15 4 weeks  5/22 5 weeks  5/29 6 weeks  6/12 8 weeks     Date          6/3/25         6/5/25              5/2 5/23/25 5/27/25 5/30/25          Visits # EVAL  2 3  4  5  6    Manual                               PROM elbow in all directions within above protocol   delaney delaney   - -  0-145    0-145                                                    Neuro                scap squeeze  Shoulder shrugs  10x  10x X10  x10  X10  x10  X10  x10   x10  10  x10ea   Bent over row with scap squeeze 3# x10 ea 3# x10  x10  X10 2#    2# x 20 2#x     shoulder raises  Flex  abd 3# x 10 ea  3# x10   x10 X20 2#  2# x    3# x20    tricep extension isometric  5\"\"  x10 x10      5\" x 10   5# x10    wrist pron/sup  0# x 20  x20     0# x 10       co-contraction at 90dg  x10 x10      x10   x10    elbow flexion/ext 1 x 10  pron  Sup  neutra        0# x 10  1 x 10  pron  Sup  neutral                                    TE                               table flexion  x10   FIS   fis  Fis fis       elbow flexion/ext  passive by PT as above  delaney  delaney  delaney  delaney      Cervical ROM exercises    no need  -         Gripping exercise    50# x 30  50# 70#  70#       wrist flexion/ext    1# X 20EA  1# x 20  1# x 20 2# x 20                      TA               g                                                                                                                                                               Modalities                               Joint protection and  pain management education   x10             HEP education  Given, performed, instructed on HEP  x15                Access Code: [email or web address]  URL: [email or web address]  Date: 05/30/2025  Prepared by: Ismael " Angelo    Exercises  - Seated Isometric Elbow Extension  - 1 x daily - 3 x weekly - 1 sets - 10 reps - 2 hold  - Seated Forearm Pronation and Supination AROM  - 1 x daily - 3 x weekly - 1 sets - 10 reps - 2 hold  - Seated Elbow Flexion with Self-Anchored Resistance  - 1 x daily - 3 x weekly - 1 sets - 10 reps - 2 hold    HEP:   Access Code: [email or web address]  URL: [email or web address]  Date: 05/14/2025  Prepared by: Ismael Stephens     Exercises  - Seated Wrist Supination Stretch  - 1 x daily - 7 x weekly - 1 sets - 10 reps  - Wrist Pronation Stretch  - 1 x daily - 7 x weekly - 1 sets - 10 reps  - Seated Scapular Retraction  - 1 x daily - 7 x weekly - 3 sets - 10 reps  - Seated Shoulder Shrugs  - 1 x daily - 7 x weekly - 3 sets - 10 reps  - Bent Over Single Arm Shoulder Row with Dumbbell  - 1 x daily - 7 x weekly - 1 sets - 10 reps  - Seated Shoulder Flexion Towel Slide at Table Top  - 1 x daily - 7 x weekly - 1 sets - 10 reps  - Seated Wrist Radial Deviation Stretch  - 1 x daily - 7 x weekly - 1 sets - 10 reps

## 2025-06-06 ENCOUNTER — OFFICE VISIT (OUTPATIENT)
Age: 35
End: 2025-06-06

## 2025-06-06 VITALS — BODY MASS INDEX: 28.77 KG/M2 | HEIGHT: 70 IN | WEIGHT: 201 LBS

## 2025-06-06 DIAGNOSIS — S46.212D RUPTURE OF LEFT DISTAL BICEPS TENDON, SUBSEQUENT ENCOUNTER: ICD-10-CM

## 2025-06-06 DIAGNOSIS — Z98.890 S/P TENDON REPAIR: Primary | ICD-10-CM

## 2025-06-06 PROCEDURE — 99024 POSTOP FOLLOW-UP VISIT: CPT | Performed by: ORTHOPAEDIC SURGERY

## 2025-06-06 NOTE — LETTER
June 6, 2025     Patient: Shyam Headley  YOB: 1990  Date of Visit: 6/6/2025      To Whom it May Concern:    Shyam Headley is under my professional care. Shyam was seen in my office on 6/6/2025. 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 He is restricted from lifting, pushing, or pulling more than 5 pounds.     If you have any questions or concerns, please don't hesitate to call.          Sincerely,          Marco You MD        CC: No Recipients

## 2025-06-08 NOTE — PROGRESS NOTES
Patient Name: Shyam Headley      : 1990       MRN: [id number]   Encounter Provider: Marco You MD   Encounter Date: 25  Encounter department: [facility] [place]         Assessment & Plan  S/P tendon repair         Rupture of left distal biceps tendon, subsequent encounter            Plan:  Shyam is a 35 y.o. male who presents 6 weeks s/p REPAIR TENDON BICEPS, Distal - Left on 2025.  He is overall doing very well.  At this time he may wean out of the elbow brace.  We discussed no heavy lifting.  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  No heavy lifting.  No lifting pushing or pulling more than 5 pounds.    Follow-up:  No follow-ups on file.     _____________________________________________________  CHIEF COMPLAINT:  Chief Complaint   Patient presents with   • Left Upper Arm - Post-op         SUBJECTIVE:  Shyam Headley is a 35 y.o. male who presents 6 weeks s/p REPAIR TENDON BICEPS, Distal - Left on 2025. He started to work with physical therapy.  He is overall doing well.  Denies any pain.  Has been using the elbow brace.    PAST MEDICAL HISTORY:  No past medical history on file.    PAST SURGICAL HISTORY:  Past Surgical History:   Procedure Laterality Date   • APPENDECTOMY     • AK RINSJ RPTD BICEPS/TRICEPS TDN DSTL W/WO TDN GRF Left 2025    Procedure: REPAIR TENDON BICEPS, Distal;  Surgeon: Marco You MD;  Location: [facility] OR;  Service: Orthopedics       FAMILY HISTORY:  Family History   Problem Relation Name Age of Onset   • No Known Problems Mother     • No Known Problems Father     • No Known Problems Sister     • No Known Problems Brother     • No Known Problems Maternal Aunt     • No Known Problems Maternal Uncle     • No Known Problems Paternal Aunt     • No Known Problems Paternal Uncle     • No Known Problems Maternal Grandmother     • No Known Problems Maternal Grandfather     • No Known  "Problems Paternal Grandmother     • No Known Problems Paternal Grandfather         SOCIAL HISTORY:  Social History     Tobacco Use   • Smoking status: Never   • Smokeless tobacco: Never   Vaping Use   • Vaping status: Never Used   Substance Use Topics   • Alcohol use: Not Currently     Comment: socially   • Drug use: Never       MEDICATIONS:    Current Outpatient Medications:   •  acetaminophen (TYLENOL) 500 mg tablet, Take 2 tablets (1,000 mg total) by mouth every 8 (eight) hours (Patient not taking: Reported on 5/7/2025), Disp: 60 tablet, Rfl: 0  •  magnesium 30 MG tablet, Take 30 mg by mouth 2 (two) times a day, Disp: , Rfl:   •  Multiple Vitamin (multivitamin) capsule, Take 1 capsule by mouth daily, Disp: , Rfl:   •  naproxen (Naprosyn) 500 mg tablet, Take 1 tablet (500 mg total) by mouth 2 (two) times a day with meals, Disp: 60 tablet, Rfl: 0  •  ondansetron (ZOFRAN) 4 mg tablet, Take 1 tablet (4 mg total) by mouth every 8 (eight) hours as needed for nausea or vomiting (Patient not taking: Reported on 5/7/2025), Disp: 10 tablet, Rfl: 0  •  Turmeric (QC TUMERIC COMPLEX PO), Take by mouth, Disp: , Rfl:     ALLERGIES:  No Known Allergies    LABS:  HgA1c: No results found for: \"HGBA1C\"  BMP: No results found for: \"GLUCOSE\", \"CALCIUM\", \"NA\", \"K\", \"CO2\", \"CL\", \"BUN\", \"CREATININE\"  CBC: No components found for: \"CBC\"    _____________________________________________________  Review of Systems   Constitutional:  Positive for activity change. Negative for chills and fever.   HENT:  Negative for ear pain and sore throat.    Eyes:  Negative for pain and visual disturbance.   Respiratory:  Negative for cough and shortness of breath.    Cardiovascular:  Negative for chest pain and palpitations.   Gastrointestinal:  Negative for abdominal pain and vomiting.   Genitourinary:  Negative for dysuria and hematuria.   Musculoskeletal:  Positive for myalgias. Negative for arthralgias and back pain.   Skin:  Positive for wound " (surgical wound left forearm). Negative for color change and rash.   Neurological:  Negative for seizures and syncope.   All other systems reviewed and are negative.       Left Elbow Exam     Tenderness   The patient is experiencing no tenderness.     Range of Motion   Left elbow extension: 5.   Flexion:  130     Other   Erythema: absent  Scars: present (well approximated surgical wound)  Sensation: normal  Pulse: present    Comments:  Well-maintained ROM  Deferred strength due to postoperative state  Negative Hook test  Skin is warm and dry to touch with no signs of erythema, ecchymosis, or infection  2+ distal radial pulse with brisk capillary refill to the fingers  Radial, median, and ulnar motor and sensory distrubution intact  Sensation to light touch intact distally              Physical Exam  Vitals and nursing note reviewed.   Constitutional:       Appearance: Normal appearance.   HENT:      Head: Normocephalic and atraumatic.      Right Ear: External ear normal.      Left Ear: External ear normal.      Nose: Nose normal.     Eyes:      General: No scleral icterus.     Extraocular Movements: Extraocular movements intact.      Conjunctiva/sclera: Conjunctivae normal.       Cardiovascular:      Rate and Rhythm: Normal rate.   Pulmonary:      Effort: Pulmonary effort is normal. No respiratory distress.     Musculoskeletal:      Cervical back: Normal range of motion and neck supple.      Comments: See ortho exam     Skin:     General: Skin is warm and dry.     Neurological:      Mental Status: He is alert and oriented to person, place, and time.     Psychiatric:         Mood and Affect: Mood normal.         Behavior: Behavior normal.        _____________________________________________________  STUDIES REVIEWED:  No studies to review.      PROCEDURES PERFORMED:  No procedures    Scribe Attestation    I,:   am acting as a scribe while in the presence of the attending physician.:       I,:   personally performed  the services described in this documentation    as scribed in my presence.:

## 2025-06-10 ENCOUNTER — OFFICE VISIT (OUTPATIENT)
Dept: PHYSICAL THERAPY | Facility: CLINIC | Age: 35
End: 2025-06-10
Attending: ORTHOPAEDIC SURGERY
Payer: COMMERCIAL

## 2025-06-10 DIAGNOSIS — T81.89XD POSTOPERATIVE TENDON RUPTURE, SUBSEQUENT ENCOUNTER: ICD-10-CM

## 2025-06-10 DIAGNOSIS — S46.212A BICEPS MUSCLE TEAR, LEFT, INITIAL ENCOUNTER: Primary | ICD-10-CM

## 2025-06-10 PROCEDURE — 97140 MANUAL THERAPY 1/> REGIONS: CPT

## 2025-06-10 PROCEDURE — 97110 THERAPEUTIC EXERCISES: CPT

## 2025-06-10 NOTE — PROGRESS NOTES
Daily Note     Today's date: 6/10/2025  Patient name: Shyam Headley  : 1990  MRN: [id number]  Referring provider: Marco You MD  Dx:   Encounter Diagnosis     ICD-10-CM    1. Biceps muscle tear, left, initial encounter  S46.212A       2. Postoperative tendon rupture, subsequent encounter  T81.89XD           Start Time: 1015  Stop Time: 1100  Total time in clinic (min): 45 minutes    Subjective: MD was pleased      Objective: See treatment diary below      Assessment: Tolerated treatment well. Patient would benefit from continued PT      Plan: Continue per plan of care.         PRECAUTIONS: Follow Protocol as per MD orders  DATE OF SURGERY:     4 weeks - may 15th  5 weeks -   8 weeks - 2025     Biceps Tendon Repair     Phase I ( 0-7 days post-op):   Posterior splint at 90 degrees of elbow flexion.   Wrist and hand gripping exercises.        Phase II (1 week through 5 weeks post-op):   Brace locked at 90 when not working on therapy exercises.   Elbow PROM and self assisted.   1 week post-op .   3 weeks post-op    5 weeks post-op    Shoulder Passive, Active Assisted, and Active Range of motion as tolerated all planes.   Scapular retraction and shoulder shrugs.   Active wrist flexion and extension.   Passive supination and pronation stretch.   No active elbow flexion or supination.   Gripping exercises.        Phase III (6 weeks post-op):   Begin active elbow flexion and supination at 6 weeks post-op.   Begin co-contraction exercises of biceps and triceps.   Elbow ROM.   Discontinue brace 8 weeks post-op (0-145).   8 weeks post-op begin   Light isotonic triceps.   Isotonic wrist flexors/extensors.   Shoulder rotator cuff and scapular strengthening.         Phase IV (12 weeks post-op):   Biceps light isotonics progressing 1 pound per week beginning at week 12 weeks post-op.   Initiate resisted UBE.         Phase V (16 weeks post-op through week 26):   Progressive  "strengthening as tolerated.   Functional progression to athletic activity.   Return to full participation in athletic activity at 5 months post-op.      Date of Surgery 4-17-25  5/15 4 weeks  5/22 5 weeks  5/29 6 weeks  6/12 8 weeks     Date          6/3/25         6/5/25              6/10/25                5/23/25           5/27/25                5/30/25          Visits # EVAL  2 3  4  5  6    Manual                               PROM elbow in all directions within above protocol   delaney delaney   -0-145 -  0-145    0-145                                                    Neuro                scap squeeze  Shoulder shrugs  10x  10x X10  x10  X10  x10  X10  x10   x10  10  x10ea   Bent over row with scap squeeze 3# x10 ea 3# x10  x10  X10 2#    2# x 20 2#x     shoulder raises  Flex  abd 3# x 10 ea  3# x10   4# x15 X20 2#  2# x    3# x20    tricep extension isometric  5\"\"  x10 x10   1# x 20  Isotonic  Bent over    5\" x 10   5# x10    wrist pron/sup  0# x 20  x20  x20   0# x 10       co-contraction at 90dg  x10 x10   x10   x10   x10    elbow flexion/ext 1 x 10  pron  Sup  neutra   1 x 10  pron  Sup  neutra  1 x 10  pron  Sup  Neutral  1#   0# x 10  1 x 10  pron  Sup  neutral                                    TE                               table flexion  x10   FIS   fis  Fis fis               Scaption    1# x 20      Gripping exercise    50# x 30  100# 70#  70#       wrist flexion/ext    1# X 20EA  1# x 20  1# x 20 2# x 20       band rows     Ytb ( wrist in pronation) x20          TA               g                                                                                                                                                               Modalities                               Joint protection and  pain management education   x10             HEP education  Given, performed, instructed on HEP  x15                Access Code: [email or web address]  URL: [email or web address]  Date: 05/30/2025  Prepared " by: Ismael Stephens    Exercises  - Seated Isometric Elbow Extension  - 1 x daily - 3 x weekly - 1 sets - 10 reps - 2 hold  - Seated Forearm Pronation and Supination AROM  - 1 x daily - 3 x weekly - 1 sets - 10 reps - 2 hold  - Seated Elbow Flexion with Self-Anchored Resistance  - 1 x daily - 3 x weekly - 1 sets - 10 reps - 2 hold    HEP:   Access Code: [email or web address]  URL: [email or web address]  Date: 05/14/2025  Prepared by: Ismael Stephens     Exercises  - Seated Wrist Supination Stretch  - 1 x daily - 7 x weekly - 1 sets - 10 reps  - Wrist Pronation Stretch  - 1 x daily - 7 x weekly - 1 sets - 10 reps  - Seated Scapular Retraction  - 1 x daily - 7 x weekly - 3 sets - 10 reps  - Seated Shoulder Shrugs  - 1 x daily - 7 x weekly - 3 sets - 10 reps  - Bent Over Single Arm Shoulder Row with Dumbbell  - 1 x daily - 7 x weekly - 1 sets - 10 reps  - Seated Shoulder Flexion Towel Slide at Table Top  - 1 x daily - 7 x weekly - 1 sets - 10 reps  - Seated Wrist Radial Deviation Stretch  - 1 x daily - 7 x weekly - 1 sets - 10 reps

## 2025-06-12 ENCOUNTER — OFFICE VISIT (OUTPATIENT)
Dept: PHYSICAL THERAPY | Facility: CLINIC | Age: 35
End: 2025-06-12
Attending: ORTHOPAEDIC SURGERY
Payer: COMMERCIAL

## 2025-06-12 DIAGNOSIS — S46.212A BICEPS MUSCLE TEAR, LEFT, INITIAL ENCOUNTER: ICD-10-CM

## 2025-06-12 DIAGNOSIS — T81.89XD POSTOPERATIVE TENDON RUPTURE, SUBSEQUENT ENCOUNTER: Primary | ICD-10-CM

## 2025-06-12 PROCEDURE — 97140 MANUAL THERAPY 1/> REGIONS: CPT

## 2025-06-12 PROCEDURE — 97110 THERAPEUTIC EXERCISES: CPT

## 2025-06-12 NOTE — PROGRESS NOTES
Daily Note     Today's date: 2025  Patient name: Shyam Headley  : 1990  MRN: [id number]  Referring provider: Marco You MD  Dx:   Encounter Diagnosis     ICD-10-CM    1. Postoperative tendon rupture, subsequent encounter  T81.89XD       2. Biceps muscle tear, left, initial encounter  S46.212A                      Subjective: minor soreness in the post elbow but it loosened up very quick      Objective: See treatment diary below      Assessment: Tolerated treatment well. Patient demonstrated fatigue post treatment and would benefit from continued PT- DOMS from last tx session       Plan: Continue per plan of care.         PRECAUTIONS: Follow Protocol as per MD orders  DATE OF SURGERY:     4 weeks - may 15th  5 weeks -   8 weeks - 2025     Biceps Tendon Repair     Phase I ( 0-7 days post-op):   Posterior splint at 90 degrees of elbow flexion.   Wrist and hand gripping exercises.        Phase II (1 week through 5 weeks post-op):   Brace locked at 90 when not working on therapy exercises.   Elbow PROM and self assisted.   1 week post-op .   3 weeks post-op    5 weeks post-op    Shoulder Passive, Active Assisted, and Active Range of motion as tolerated all planes.   Scapular retraction and shoulder shrugs.   Active wrist flexion and extension.   Passive supination and pronation stretch.   No active elbow flexion or supination.   Gripping exercises.        Phase III (6 weeks post-op):   Begin active elbow flexion and supination at 6 weeks post-op.   Begin co-contraction exercises of biceps and triceps.   Elbow ROM.   Discontinue brace 8 weeks post-op (0-145).   8 weeks post-op begin   Light isotonic triceps.   Isotonic wrist flexors/extensors.   Shoulder rotator cuff and scapular strengthening.         Phase IV (12 weeks post-op):   Biceps light isotonics progressing 1 pound per week beginning at week 12 weeks post-op.   Initiate resisted UBE.         Phase V  "(16 weeks post-op through week 26):   Progressive strengthening as tolerated.   Functional progression to athletic activity.   Return to full participation in athletic activity at 5 months post-op.      Date of Surgery 4-17-25  5/15 4 weeks  5/22 5 weeks  5/29 6 weeks  6/12 8 weeks     Date          6/3/25         6/5/25              6/10/25                6/12/25         Visits # EVAL  2 3  4  5  6    Manual                               PROM elbow in all directions within above protocol   delaney delaney   -0-145 0-145 0-145    0-145                                                    Neuro                scap squeeze  Shoulder shrugs  10x  10x X10  x10  X10  x10  X10  x10   x10  10  x10ea   Bent over row with scap squeeze 3# x10 ea 3# x10  x10  X10 2#    2# x 20 2#x     shoulder raises  Flex  abd 3# x 10 ea  3# x10   4# x15 X20 4#  2# x    3# x20    tricep extension isometric  5\"\"  x10 x10   1# x 20  Isotonic  Bent over   1# x 15 x 2 5\" x 10   5# x10    wrist pron/sup  0# x 20  x20  x20 x20  0# x 10       co-contraction at 90dg  x10 x10   x10  x10 x10   x10    elbow flexion/ext 1 x 10  pron  Sup  neutra   1 x 10  pron  Sup  neutra  1 x 10  pron  Sup  Neutral  1#  1 x 10  pron  Sup  Neutral  1# 0# x 10  1 x 10  pron  Sup  neutral     sidelying ER      1# x 30   1# x 30 1# x 30                      TE                               table flexion  x10   FIS   fis  Fis fis               Scaption    1# x 20 2# x 20      Gripping exercise    50# x 30  100# 100#  70#       wrist flexion/ext    1# X 20EA  1# x 20  1# x 20 2# x 20       band rows     Ytb ( wrist in pronation) x20   ytb  ( wrist in pronation) x20        TA               g                                                                                                                                                               Modalities                               Joint protection and  pain management education   x10             HEP education  Given, performed, " instructed on HEP  x15                Access Code: [email or web address]  URL: [email or web address]  Date: 05/30/2025  Prepared by: Ismael Stephens    Exercises  - Seated Isometric Elbow Extension  - 1 x daily - 3 x weekly - 1 sets - 10 reps - 2 hold  - Seated Forearm Pronation and Supination AROM  - 1 x daily - 3 x weekly - 1 sets - 10 reps - 2 hold  - Seated Elbow Flexion with Self-Anchored Resistance  - 1 x daily - 3 x weekly - 1 sets - 10 reps - 2 hold    HEP:   Access Code: [email or web address]  URL: [email or web address]  Date: 05/14/2025  Prepared by: Ismael Stephens     Exercises  - Seated Wrist Supination Stretch  - 1 x daily - 7 x weekly - 1 sets - 10 reps  - Wrist Pronation Stretch  - 1 x daily - 7 x weekly - 1 sets - 10 reps  - Seated Scapular Retraction  - 1 x daily - 7 x weekly - 3 sets - 10 reps  - Seated Shoulder Shrugs  - 1 x daily - 7 x weekly - 3 sets - 10 reps  - Bent Over Single Arm Shoulder Row with Dumbbell  - 1 x daily - 7 x weekly - 1 sets - 10 reps  - Seated Shoulder Flexion Towel Slide at Table Top  - 1 x daily - 7 x weekly - 1 sets - 10 reps  - Seated Wrist Radial Deviation Stretch  - 1 x daily - 7 x weekly - 1 sets - 10 reps

## 2025-06-17 ENCOUNTER — OFFICE VISIT (OUTPATIENT)
Dept: PHYSICAL THERAPY | Facility: CLINIC | Age: 35
End: 2025-06-17
Attending: ORTHOPAEDIC SURGERY
Payer: COMMERCIAL

## 2025-06-17 DIAGNOSIS — T81.89XD POSTOPERATIVE TENDON RUPTURE, SUBSEQUENT ENCOUNTER: Primary | ICD-10-CM

## 2025-06-17 DIAGNOSIS — S46.212A BICEPS MUSCLE TEAR, LEFT, INITIAL ENCOUNTER: ICD-10-CM

## 2025-06-17 PROCEDURE — 97140 MANUAL THERAPY 1/> REGIONS: CPT

## 2025-06-17 PROCEDURE — 97110 THERAPEUTIC EXERCISES: CPT

## 2025-06-17 NOTE — PROGRESS NOTES
Daily Note     Today's date: 2025  Patient name: Shyam Headley  : 1990  MRN: [id number]  Referring provider: Marco You MD  Dx:   Encounter Diagnosis     ICD-10-CM    1. Postoperative tendon rupture, subsequent encounter  T81.89XD       2. Biceps muscle tear, left, initial encounter  S46.212A                      Subjective:  no issues - tricep feels fine and bicep  no pains      Objective: See treatment diary below      Assessment: Tolerated treatment well. Patient would benefit from continued PT      Plan: Continue per plan of care.         PRECAUTIONS: Follow Protocol as per MD orders  DATE OF SURGERY:     4 weeks - may 15th  5 weeks -   8 weeks - 2025     Biceps Tendon Repair     Phase I ( 0-7 days post-op):   Posterior splint at 90 degrees of elbow flexion.   Wrist and hand gripping exercises.        Phase II (1 week through 5 weeks post-op):   Brace locked at 90 when not working on therapy exercises.   Elbow PROM and self assisted.   1 week post-op .   3 weeks post-op    5 weeks post-op    Shoulder Passive, Active Assisted, and Active Range of motion as tolerated all planes.   Scapular retraction and shoulder shrugs.   Active wrist flexion and extension.   Passive supination and pronation stretch.   No active elbow flexion or supination.   Gripping exercises.        Phase III (6 weeks post-op):   Begin active elbow flexion and supination at 6 weeks post-op.   Begin co-contraction exercises of biceps and triceps.   Elbow ROM.   Discontinue brace 8 weeks post-op (0-145).   8 weeks post-op begin   Light isotonic triceps.   Isotonic wrist flexors/extensors.   Shoulder rotator cuff and scapular strengthening.         Phase IV (12 weeks post-op):   Biceps light isotonics progressing 1 pound per week beginning at week 12 weeks post-op.   Initiate resisted UBE.         Phase V (16 weeks post-op through week 26):   Progressive strengthening as tolerated.  "  Functional progression to athletic activity.   Return to full participation in athletic activity at 5 months post-op.      Date of Surgery 4-17-25  5/15 4 weeks  5/22 5 weeks  5/29 6 weeks  6/12 8 weeks     Date          6/3/25         6/5/25              6/10/25                6/12/25      6/17/25    Visits # EVAL  2 3  4  5  6    Manual                             PROM elbow in all directions within above protocol   delaney delaney   -0-145 0-145 0-145                                                    Neuro               scap squeeze  Shoulder shrugs  10x  10x X10  x10  X10  x10  X10  x10  D/c    Bent over row with scap squeeze 3# x10 ea 3# x10  x10  X10 2#    3# x 20  dumbbells     shoulder raises  Flex  abd 3# x 10 ea  3# x10   4# x15 X20 4#  3# x  20 dumbbells      tricep extension isometric  5\"\"  x10 x10   1# x 20  Isotonic  Bent over   1# x 15 x 2 D/c     wrist pron/sup  0# x 20  x20  x20 x20  0# x 10      co-contraction at 90dg  x10 x10   x10  x10 x10      elbow flexion/ 1 x 10  pron  Sup  neutra   1 x 10  pron  Sup  neutra  1 x 10  pron  Sup  Neutral  1#  1 x 10  pron  Sup  Neutral  3# 3# x 10     Elbow extension     Rtb x 20     sidelying ER      1# x 30   1# x 30 1# x 30       elb               TE                               table flexion  x10   FIS   fis  Fis fis                        Gripping exercise    50# x 30  100# 100#  100#       wrist flexion/ext    1# X 20EA  1# x 20  1# x 20 4# x 20       band rows/ext     Ytb ( wrist in pronation) x20   ytb  ( wrist in pronation) x20   rtb (wrist pronation)  x20     Band ER          rtb x 30     g                                                                                                                                                               Modalities                               Joint protection and  pain management education   x10             HEP education  Given, performed, instructed on HEP  x15                Access Code: [email or web address]  URL: " [email or web address]  Date: 05/30/2025  Prepared by: Ismael Stephens    Exercises  - Seated Isometric Elbow Extension  - 1 x daily - 3 x weekly - 1 sets - 10 reps - 2 hold  - Seated Forearm Pronation and Supination AROM  - 1 x daily - 3 x weekly - 1 sets - 10 reps - 2 hold  - Seated Elbow Flexion with Self-Anchored Resistance  - 1 x daily - 3 x weekly - 1 sets - 10 reps - 2 hold    HEP:   Access Code: [email or web address]  URL: [email or web address]  Date: 05/14/2025  Prepared by: Ismael Stephens     Exercises  - Seated Wrist Supination Stretch  - 1 x daily - 7 x weekly - 1 sets - 10 reps  - Wrist Pronation Stretch  - 1 x daily - 7 x weekly - 1 sets - 10 reps  - Seated Scapular Retraction  - 1 x daily - 7 x weekly - 3 sets - 10 reps  - Seated Shoulder Shrugs  - 1 x daily - 7 x weekly - 3 sets - 10 reps  - Bent Over Single Arm Shoulder Row with Dumbbell  - 1 x daily - 7 x weekly - 1 sets - 10 reps  - Seated Shoulder Flexion Towel Slide at Table Top  - 1 x daily - 7 x weekly - 1 sets - 10 reps  - Seated Wrist Radial Deviation Stretch  - 1 x daily - 7 x weekly - 1 sets - 10 reps

## 2025-06-19 ENCOUNTER — OFFICE VISIT (OUTPATIENT)
Dept: PHYSICAL THERAPY | Facility: CLINIC | Age: 35
End: 2025-06-19
Attending: ORTHOPAEDIC SURGERY
Payer: COMMERCIAL

## 2025-06-19 DIAGNOSIS — S46.212A BICEPS MUSCLE TEAR, LEFT, INITIAL ENCOUNTER: ICD-10-CM

## 2025-06-19 DIAGNOSIS — T81.89XD POSTOPERATIVE TENDON RUPTURE, SUBSEQUENT ENCOUNTER: Primary | ICD-10-CM

## 2025-06-19 PROCEDURE — 97110 THERAPEUTIC EXERCISES: CPT

## 2025-06-19 PROCEDURE — 97140 MANUAL THERAPY 1/> REGIONS: CPT

## 2025-06-20 ENCOUNTER — OFFICE VISIT (OUTPATIENT)
Dept: PHYSICAL THERAPY | Facility: CLINIC | Age: 35
End: 2025-06-20
Attending: ORTHOPAEDIC SURGERY
Payer: COMMERCIAL

## 2025-06-20 DIAGNOSIS — S46.212A BICEPS MUSCLE TEAR, LEFT, INITIAL ENCOUNTER: ICD-10-CM

## 2025-06-20 DIAGNOSIS — T81.89XD POSTOPERATIVE TENDON RUPTURE, SUBSEQUENT ENCOUNTER: Primary | ICD-10-CM

## 2025-06-20 PROCEDURE — 97110 THERAPEUTIC EXERCISES: CPT | Performed by: PHYSICAL THERAPIST

## 2025-06-20 PROCEDURE — 97112 NEUROMUSCULAR REEDUCATION: CPT | Performed by: PHYSICAL THERAPIST

## 2025-06-20 PROCEDURE — 97140 MANUAL THERAPY 1/> REGIONS: CPT | Performed by: PHYSICAL THERAPIST

## 2025-06-20 NOTE — PROGRESS NOTES
Daily Note     Today's date: 2025  Patient name: Shyam Headley  : 1990  MRN: [id number]  Referring provider: Marco You MD  Dx:   Encounter Diagnosis     ICD-10-CM    1. Postoperative tendon rupture, subsequent encounter  T81.89XD       2. Biceps muscle tear, left, initial encounter  S46.212A                      Subjective: My left arm is feeling very good.      Objective: See treatment diary below      Assessment: Tolerated treatment well. Patient demonstrated fatigue post treatment and exhibited good technique with therapeutic exercises      Plan: Continue per plan of care.         PRECAUTIONS: Follow Protocol as per MD orders  DATE OF SURGERY:     4 weeks - may 15th  5 weeks -   8 weeks - 2025     Biceps Tendon Repair     Phase I ( 0-7 days post-op):   Posterior splint at 90 degrees of elbow flexion.   Wrist and hand gripping exercises.        Phase II (1 week through 5 weeks post-op):   Brace locked at 90 when not working on therapy exercises.   Elbow PROM and self assisted.   1 week post-op .   3 weeks post-op    5 weeks post-op    Shoulder Passive, Active Assisted, and Active Range of motion as tolerated all planes.   Scapular retraction and shoulder shrugs.   Active wrist flexion and extension.   Passive supination and pronation stretch.   No active elbow flexion or supination.   Gripping exercises.        Phase III (6 weeks post-op):   Begin active elbow flexion and supination at 6 weeks post-op.   Begin co-contraction exercises of biceps and triceps.   Elbow ROM.   Discontinue brace 8 weeks post-op (0-145).   8 weeks post-op begin   Light isotonic triceps.   Isotonic wrist flexors/extensors.   Shoulder rotator cuff and scapular strengthening.         Phase IV (12 weeks post-op):   Biceps light isotonics progressing 1 pound per week beginning at week 12 weeks post-op.   Initiate resisted UBE.         Phase V (16 weeks post-op through week 26):    Progressive strengthening as tolerated.   Functional progression to athletic activity.   Return to full participation in athletic activity at 5 months post-op.      Date of Surgery 4-17-25  5/15 4 weeks  5/22 5 weeks  5/29 6 weeks  6/12 8 weeks     Date          6/20         6/5/25              6/10/25                6/12/25      6/17/25  6/19/25   Visits # 7  2 3  4  5  6    Manual                             PROM elbow in all directions within above protocol   mv delaney   -0-145 0-145 0-145    Full                                                 Neuro               scap squeeze  Shoulder shrugs  10x  10x X10  x10  X10  x10  X10  x10  D/c    Bent over row with scap squeeze 3# x10 ea 3# x10  x10  X10 2#    3# x 20  dumbbells 4# x 15    shoulder raises  Flex  abd 3# x 10 ea  3# x10   4# x15 X20 4#  3# x  20 dumbbells  4# x 15   Overhead press      4# x15             wrist pron/sup  0# x 20  x20  x20 x20  0# x 10  x10             elbow flexion/ #3  20x  1 x 10  pron  Sup  neutra  1 x 10  pron  Sup  Neutral  1#  1 x 10  pron  Sup  Neutral  3# 3# x 10  3#  1 x 10  pron  Sup  Neutral  ea     Elbow extension #4 20x    Bent over Bent over 4# 15 pron  X15 sup    sidelying ER      1# x 30   1# x 30 1# x 30  3# x30                    TE               Seratus punches #3 20x               table flexion  x10   FIS   fis  Fis fis   FIS    Prone shoulder habd      2# x 20 3# x 20            Gripping exercise    50# x 30  100# 100#  100#   100#     wrist flexion/ext    1# X 20EA  1# x 20  1# x 20 4# x 20  4#     band rows/ext     Ytb ( wrist in pronation) x20   ytb  ( wrist in pronation) x20   rtb (wrist pronation)  x20  rtb (wrist pronation)  x20    Band ER          rtb x 30 X 20                                                                                                                                                                    Modalities                               Joint protection and  pain management education   x10              HEP education  Given, performed, instructed on HEP  x15                Access Code: [email or web address]  URL: [email or web address]  Date: 05/30/2025  Prepared by: Ismael Stephens    Exercises  - Seated Isometric Elbow Extension  - 1 x daily - 3 x weekly - 1 sets - 10 reps - 2 hold  - Seated Forearm Pronation and Supination AROM  - 1 x daily - 3 x weekly - 1 sets - 10 reps - 2 hold  - Seated Elbow Flexion with Self-Anchored Resistance  - 1 x daily - 3 x weekly - 1 sets - 10 reps - 2 hold    HEP:   Access Code: [email or web address]  URL: [email or web address]  Date: 05/14/2025  Prepared by: Ismael Stephens     Exercises  - Seated Wrist Supination Stretch  - 1 x daily - 7 x weekly - 1 sets - 10 reps  - Wrist Pronation Stretch  - 1 x daily - 7 x weekly - 1 sets - 10 reps  - Seated Scapular Retraction  - 1 x daily - 7 x weekly - 3 sets - 10 reps  - Seated Shoulder Shrugs  - 1 x daily - 7 x weekly - 3 sets - 10 reps  - Bent Over Single Arm Shoulder Row with Dumbbell  - 1 x daily - 7 x weekly - 1 sets - 10 reps  - Seated Shoulder Flexion Towel Slide at Table Top  - 1 x daily - 7 x weekly - 1 sets - 10 reps  - Seated Wrist Radial Deviation Stretch  - 1 x daily - 7 x weekly - 1 sets - 10 reps

## 2025-06-27 NOTE — PROGRESS NOTES
Daily Note     Today's date: 2025  Patient name: Shyam Headley  : 1990  MRN: [id number]  Referring provider: Marco You MD  Dx:   Encounter Diagnosis     ICD-10-CM    1. Postoperative tendon rupture, subsequent encounter  T81.89XD       2. Biceps muscle tear, left, initial encounter  S46.212A           Start Time: 1145  Stop Time: 1230  Total time in clinic (min): 45 minutes    Subjective: minor tightness in bicep when I am still and I go to move it but loosens up really quick       Objective: See treatment diary below      Assessment: Tolerated treatment well. Patient demonstrated fatigue post treatment and would benefit from continued PT  Much less firm end feel with wrist pronation today.  Excellent response to PT today    Plan: Continue per plan of care.         PRECAUTIONS: Follow Protocol as per MD orders  DATE OF SURGERY:     4 weeks - may 15th  5 weeks -   8 weeks - 2025     Biceps Tendon Repair     Phase I ( 0-7 days post-op):   Posterior splint at 90 degrees of elbow flexion.   Wrist and hand gripping exercises.        Phase II (1 week through 5 weeks post-op):   Brace locked at 90 when not working on therapy exercises.   Elbow PROM and self assisted.   1 week post-op .   3 weeks post-op    5 weeks post-op    Shoulder Passive, Active Assisted, and Active Range of motion as tolerated all planes.   Scapular retraction and shoulder shrugs.   Active wrist flexion and extension.   Passive supination and pronation stretch.   No active elbow flexion or supination.   Gripping exercises.        Phase III (6 weeks post-op):   Begin active elbow flexion and supination at 6 weeks post-op.   Begin co-contraction exercises of biceps and triceps.   Elbow ROM.   Discontinue brace 8 weeks post-op (0-145).   8 weeks post-op begin   Light isotonic triceps.   Isotonic wrist flexors/extensors.   Shoulder rotator cuff and scapular strengthening.         Phase IV (12  Recent PHQ 2/9 Score    PHQ 2:  PHQ 2 Score Adult PHQ 2 Score Adult PHQ 2 Interpretation Little interest or pleasure in activity?   6/27/2025   9:51 AM 0 No further screening needed 0       PHQ 9:  PHQ 9 Score Adult PHQ 9 Score   6/27/2025   9:51 AM 0       weeks post-op):   Biceps light isotonics progressing 1 pound per week beginning at week 12 weeks post-op.   Initiate resisted UBE.         Phase V (16 weeks post-op through week 26):   Progressive strengthening as tolerated.   Functional progression to athletic activity.   Return to full participation in athletic activity at 5 months post-op.      Date of Surgery 4-17-25  5/15 4 weeks  5/22 5 weeks  5/29 6 weeks  6/12 8 weeks     Date          6/3/25         6/5/25              6/10/25                6/12/25      6/17/25  6/19/25   Visits # EVAL  2 3  4  5  6    Manual                             PROM elbow in all directions within above protocol   delaney delaney   -0-145 0-145 0-145    Full                                                 Neuro               scap squeeze  Shoulder shrugs  10x  10x X10  x10  X10  x10  X10  x10  D/c    Bent over row with scap squeeze 3# x10 ea 3# x10  x10  X10 2#    3# x 20  dumbbells 4# x 15    shoulder raises  Flex  abd 3# x 10 ea  3# x10   4# x15 X20 4#  3# x  20 dumbbells  4# x 15   Overhead press      4# x15             wrist pron/sup  0# x 20  x20  x20 x20  0# x 10  x10             elbow flexion/ 1 x 10  pron  Sup  neutra   1 x 10  pron  Sup  neutra  1 x 10  pron  Sup  Neutral  1#  1 x 10  pron  Sup  Neutral  3# 3# x 10  3#  1 x 10  pron  Sup  Neutral  ea     Elbow extension     Bent over Bent over 4# 15 pron  X15 sup    sidelying ER      1# x 30   1# x 30 1# x 30  3# x30                    TE               Seratus punches                table flexion  x10   FIS   fis  Fis fis   FIS    Prone shoulder habd      2# x 20 3# x 20            Gripping exercise    50# x 30  100# 100#  100#   100#     wrist flexion/ext    1# X 20EA  1# x 20  1# x 20 4# x 20  4#     band rows/ext     Ytb ( wrist in pronation) x20   ytb  ( wrist in pronation) x20   rtb (wrist pronation)  x20  rtb (wrist pronation)  x20    Band ER          rtb x 30 X 20                                                                                                                                                                     Modalities                               Joint protection and  pain management education   x10             HEP education  Given, performed, instructed on HEP  x15                Access Code: [email or web address]  URL: [email or web address]  Date: 05/30/2025  Prepared by: Ismael Stephens    Exercises  - Seated Isometric Elbow Extension  - 1 x daily - 3 x weekly - 1 sets - 10 reps - 2 hold  - Seated Forearm Pronation and Supination AROM  - 1 x daily - 3 x weekly - 1 sets - 10 reps - 2 hold  - Seated Elbow Flexion with Self-Anchored Resistance  - 1 x daily - 3 x weekly - 1 sets - 10 reps - 2 hold    HEP:   Access Code: [email or web address]  URL: [email or web address]  Date: 05/14/2025  Prepared by: Ismael Stephens     Exercises  - Seated Wrist Supination Stretch  - 1 x daily - 7 x weekly - 1 sets - 10 reps  - Wrist Pronation Stretch  - 1 x daily - 7 x weekly - 1 sets - 10 reps  - Seated Scapular Retraction  - 1 x daily - 7 x weekly - 3 sets - 10 reps  - Seated Shoulder Shrugs  - 1 x daily - 7 x weekly - 3 sets - 10 reps  - Bent Over Single Arm Shoulder Row with Dumbbell  - 1 x daily - 7 x weekly - 1 sets - 10 reps  - Seated Shoulder Flexion Towel Slide at Table Top  - 1 x daily - 7 x weekly - 1 sets - 10 reps  - Seated Wrist Radial Deviation Stretch  - 1 x daily - 7 x weekly - 1 sets - 10 reps

## 2025-06-30 ENCOUNTER — OFFICE VISIT (OUTPATIENT)
Dept: PHYSICAL THERAPY | Facility: CLINIC | Age: 35
End: 2025-06-30
Attending: ORTHOPAEDIC SURGERY
Payer: COMMERCIAL

## 2025-06-30 DIAGNOSIS — S46.212A BICEPS MUSCLE TEAR, LEFT, INITIAL ENCOUNTER: ICD-10-CM

## 2025-06-30 DIAGNOSIS — T81.89XD POSTOPERATIVE TENDON RUPTURE, SUBSEQUENT ENCOUNTER: Primary | ICD-10-CM

## 2025-06-30 PROCEDURE — 97110 THERAPEUTIC EXERCISES: CPT

## 2025-06-30 PROCEDURE — 97112 NEUROMUSCULAR REEDUCATION: CPT

## 2025-06-30 PROCEDURE — 97140 MANUAL THERAPY 1/> REGIONS: CPT

## 2025-06-30 NOTE — PROGRESS NOTES
Daily Note     Today's date: 2025  Patient name: Shyam Headley  : 1990  MRN: [id number]  Referring provider: Marco You MD  Dx:   Encounter Diagnosis     ICD-10-CM    1. Postoperative tendon rupture, subsequent encounter  T81.89XD       2. Biceps muscle tear, left, initial encounter  S46.212A                      Subjective: doing really well just weakness in the forearm       Objective: See treatment diary below      Assessment: Tolerated treatment well. Patient demonstrated fatigue post treatment and would benefit from continued PT      Plan: Continue per plan of care.         PRECAUTIONS: Follow Protocol as per MD orders  DATE OF SURGERY:     4 weeks - may 15th  5 weeks -   8 weeks - 2025     Biceps Tendon Repair     Phase I ( 0-7 days post-op):   Posterior splint at 90 degrees of elbow flexion.   Wrist and hand gripping exercises.        Phase II (1 week through 5 weeks post-op):   Brace locked at 90 when not working on therapy exercises.   Elbow PROM and self assisted.   1 week post-op .   3 weeks post-op    5 weeks post-op    Shoulder Passive, Active Assisted, and Active Range of motion as tolerated all planes.   Scapular retraction and shoulder shrugs.   Active wrist flexion and extension.   Passive supination and pronation stretch.   No active elbow flexion or supination.   Gripping exercises.        Phase III (6 weeks post-op):   Begin active elbow flexion and supination at 6 weeks post-op.   Begin co-contraction exercises of biceps and triceps.   Elbow ROM.   Discontinue brace 8 weeks post-op (0-145).   8 weeks post-op begin   Light isotonic triceps.   Isotonic wrist flexors/extensors.   Shoulder rotator cuff and scapular strengthening.         Phase IV (12 weeks post-op):   Biceps light isotonics progressing 1 pound per week beginning at week 12 weeks post-op.   Initiate resisted UBE.         Phase V (16 weeks post-op through week 26):    Progressive strengthening as tolerated.   Functional progression to athletic activity.   Return to full participation in athletic activity at 5 months post-op.      Date of Surgery 4-17-25  5/15 4 weeks  5/22 5 weeks  5/29 6 weeks  6/12 8 weeks  7/3 11 weeks      Date          6/20         6/30              6/10/25                6/12/25      6/17/25  6/19/25   Visits # 7  2 3  4  5  6    Manual                             PROM elbow in all directions within above protocol   mv delaney   -0-145 0-145 0-145    Full                                                 Neuro               scap squeeze  Shoulder shrugs  10x  10x X10  x10  X10  x10  X10  x10  D/c    Bent over row with scap squeeze 3# x10 ea 5# x15  x10  X10 2#    3# x 20  dumbbells 4# x 15    shoulder raises  Flex  abd 3# x 10 ea  5# x15   4# x15 X20 4#  3# x  20 dumbbells  4# x 15   Overhead press  5#x15 5# x 15   4# x15             wrist pron/sup  0# x 20  x20  x20 x20  0# x 10  x10             elbow flexion/ #3  20x  1 x 10  pron  Sup  neutra   5# 1 x 10  pron  Sup  Neutral  1#  1 x 10  pron  Sup  Neutral  3# 3# x 10  3#  1 x 10  pron  Sup  Neutral  ea     Elbow extension #4 20x 5# 5# x 2x 15  Bent over Bent over 4# 15 pron  X15 sup    sidelying ER      1# x 30   1# x 30 1# x 30  3# x30                    TE               Seratus punches #3 20x   5#  5# full arc   x30          table flexion  x10   FIS   fis  Fis fis   FIS    Prone shoulder habd   5# x 20    2# x 20 3# x 20   Therastick   Flex  Ext  Bending  Supination iso holds   X20  X20  X20  x20 X20  X20  X20  x20      Gripping exercise    50# x 30  100# 100#  100#   100#     wrist flexion/ext    1# X 20EA  1# x 20  1# x 20 4# x 20  4#     band rows/ext     gtb ( wrist in pronation) x20   ytb  ( wrist in pronation) x20   rtb (wrist pronation)  x20  rtb (wrist pronation)  x20    Band ER      gtb  10x with wrist sup,pron, and neutral    rtb x 30 X 20                                                                                                                                                                     Modalities                               Joint protection and  pain management education   x10             HEP education  Given, performed, instructed on HEP  x15                Access Code: [email or web address]  URL: [email or web address]  Date: 05/30/2025  Prepared by: Ismael Stephens    Exercises  - Seated Isometric Elbow Extension  - 1 x daily - 3 x weekly - 1 sets - 10 reps - 2 hold  - Seated Forearm Pronation and Supination AROM  - 1 x daily - 3 x weekly - 1 sets - 10 reps - 2 hold  - Seated Elbow Flexion with Self-Anchored Resistance  - 1 x daily - 3 x weekly - 1 sets - 10 reps - 2 hold    HEP:   Access Code: [email or web address]  URL: [email or web address]  Date: 05/14/2025  Prepared by: Ismael Stephens     Exercises  - Seated Wrist Supination Stretch  - 1 x daily - 7 x weekly - 1 sets - 10 reps  - Wrist Pronation Stretch  - 1 x daily - 7 x weekly - 1 sets - 10 reps  - Seated Scapular Retraction  - 1 x daily - 7 x weekly - 3 sets - 10 reps  - Seated Shoulder Shrugs  - 1 x daily - 7 x weekly - 3 sets - 10 reps  - Bent Over Single Arm Shoulder Row with Dumbbell  - 1 x daily - 7 x weekly - 1 sets - 10 reps  - Seated Shoulder Flexion Towel Slide at Table Top  - 1 x daily - 7 x weekly - 1 sets - 10 reps  - Seated Wrist Radial Deviation Stretch  - 1 x daily - 7 x weekly - 1 sets - 10 reps

## 2025-07-01 ENCOUNTER — OFFICE VISIT (OUTPATIENT)
Dept: PHYSICAL THERAPY | Facility: CLINIC | Age: 35
End: 2025-07-01
Attending: ORTHOPAEDIC SURGERY
Payer: COMMERCIAL

## 2025-07-01 DIAGNOSIS — S46.212A BICEPS MUSCLE TEAR, LEFT, INITIAL ENCOUNTER: ICD-10-CM

## 2025-07-01 DIAGNOSIS — T81.89XD POSTOPERATIVE TENDON RUPTURE, SUBSEQUENT ENCOUNTER: Primary | ICD-10-CM

## 2025-07-01 PROCEDURE — 97112 NEUROMUSCULAR REEDUCATION: CPT

## 2025-07-01 PROCEDURE — 97110 THERAPEUTIC EXERCISES: CPT

## 2025-07-01 PROCEDURE — 97140 MANUAL THERAPY 1/> REGIONS: CPT

## 2025-07-01 NOTE — PROGRESS NOTES
Daily Note     Today's date: 2025  Patient name: Shyam Headley  : 1990  MRN: [id number]  Referring provider: Marco You MD  Dx:   Encounter Diagnosis     ICD-10-CM    1. Postoperative tendon rupture, subsequent encounter  T81.89XD       2. Biceps muscle tear, left, initial encounter  S46.212A                      Subjective: no issues      Objective: See treatment diary below      Assessment: Tolerated treatment well. Patient demonstrated fatigue post treatment and would benefit from continued PT      Plan: Continue per plan of care.         PRECAUTIONS: Follow Protocol as per MD orders  DATE OF SURGERY:     4 weeks - may 15th  5 weeks -   8 weeks - 2025     Biceps Tendon Repair     Phase I ( 0-7 days post-op):   Posterior splint at 90 degrees of elbow flexion.   Wrist and hand gripping exercises.        Phase II (1 week through 5 weeks post-op):   Brace locked at 90 when not working on therapy exercises.   Elbow PROM and self assisted.   1 week post-op .   3 weeks post-op    5 weeks post-op    Shoulder Passive, Active Assisted, and Active Range of motion as tolerated all planes.   Scapular retraction and shoulder shrugs.   Active wrist flexion and extension.   Passive supination and pronation stretch.   No active elbow flexion or supination.   Gripping exercises.        Phase III (6 weeks post-op):   Begin active elbow flexion and supination at 6 weeks post-op.   Begin co-contraction exercises of biceps and triceps.   Elbow ROM.   Discontinue brace 8 weeks post-op (0-145).   8 weeks post-op begin   Light isotonic triceps.   Isotonic wrist flexors/extensors.   Shoulder rotator cuff and scapular strengthening.         Phase IV (12 weeks post-op):   Biceps light isotonics progressing 1 pound per week beginning at week 12 weeks post-op.   Initiate resisted UBE.         Phase V (16 weeks post-op through week 26):   Progressive strengthening as tolerated.  "  Functional progression to athletic activity.   Return to full participation in athletic activity at 5 months post-op.      Date of Surgery 4-17-25  5/15 4 weeks  5/22 5 weeks  5/29 6 weeks  6/12 8 weeks  7/3 11 weeks      Date          6/20 6/30 7/1/25 6/12/25 6/17/25 6/19/25   Visits # 7  2 3  4  5  6    Manual                             PROM elbow in all directions within above protocol   mv delaney   -0-145 0-145 0-145    Full                                                 Neuro               scap squeeze  Shoulder shrugs  10x  10x X10  x10  X10  x10  X10  x10  D/c    Bent over row with scap squeeze 3# x10 ea 5# x15  x10  X10 2#    3# x 20  dumbbells 4# x 15    shoulder raises  Flex  abd 3# x 10 ea  5# x15  5# x15 X20 4#  3# x  20 dumbbells  4# x 15   Overhead press  5#x15 5# x 15   4# x15   Wall push ups   x20       wrist pron/sup  0# x 20  x20  x20 x20  0# x 10  x10   Body blade   0 dg 30\" x 2  90 dg 30\"x 2       elbow flexion/ #3  20x  1 x 10  pron  Sup  neutra   5# 1 x 10  pron  Sup  Neutral  1#  1 x 10  pron  Sup  Neutral  3# 3# x 10  3#  1 x 10  pron  Sup  Neutral  ea     Elbow extension #4 20x 5# 5# x 2x 15  Bent over Bent over 4# 15 pron  X15 sup    sidelying ER      1# x 30   1# x 30 1# x 30  3# x30                    TE               Seratus punches #3 20x   5#  6# full arc   x30          table flexion  x10   FIS  D/c  Fis fis   FIS    Prone shoulder habd   5# x 20  5# x 20  2# x 20 3# x 20   Therastick   Flex  Ext  Bending  Supination iso holds   X20  X20  X20  x20 X20  X20  X20  x20      Gripping exercise    50# x 30  100# 100#  100#   100#              band rows/ext     gtb ( wrist in pronation) x20   ytb  ( wrist in pronation) x20   rtb (wrist pronation)  x20  rtb (wrist pronation)  x20    Band ER      gtb  10x with wrist sup,pron, and neutral    rtb x 30 X 20                                                                                                      "                                                               Modalities                               Joint protection and  pain management education   x10             HEP education  Given, performed, instructed on HEP  x15                Access Code: [email or web address]  URL: [email or web address]  Date: 05/30/2025  Prepared by: Ismael Stephens    Exercises  - Seated Isometric Elbow Extension  - 1 x daily - 3 x weekly - 1 sets - 10 reps - 2 hold  - Seated Forearm Pronation and Supination AROM  - 1 x daily - 3 x weekly - 1 sets - 10 reps - 2 hold  - Seated Elbow Flexion with Self-Anchored Resistance  - 1 x daily - 3 x weekly - 1 sets - 10 reps - 2 hold    HEP:   Access Code: [email or web address]  URL: [email or web address]  Date: 05/14/2025  Prepared by: Ismael Stephens     Exercises  - Seated Wrist Supination Stretch  - 1 x daily - 7 x weekly - 1 sets - 10 reps  - Wrist Pronation Stretch  - 1 x daily - 7 x weekly - 1 sets - 10 reps  - Seated Scapular Retraction  - 1 x daily - 7 x weekly - 3 sets - 10 reps  - Seated Shoulder Shrugs  - 1 x daily - 7 x weekly - 3 sets - 10 reps  - Bent Over Single Arm Shoulder Row with Dumbbell  - 1 x daily - 7 x weekly - 1 sets - 10 reps  - Seated Shoulder Flexion Towel Slide at Table Top  - 1 x daily - 7 x weekly - 1 sets - 10 reps  - Seated Wrist Radial Deviation Stretch  - 1 x daily - 7 x weekly - 1 sets - 10 reps

## 2025-07-03 ENCOUNTER — OFFICE VISIT (OUTPATIENT)
Dept: PHYSICAL THERAPY | Facility: CLINIC | Age: 35
End: 2025-07-03
Attending: ORTHOPAEDIC SURGERY
Payer: COMMERCIAL

## 2025-07-03 DIAGNOSIS — T81.89XD POSTOPERATIVE TENDON RUPTURE, SUBSEQUENT ENCOUNTER: Primary | ICD-10-CM

## 2025-07-03 DIAGNOSIS — S46.212A BICEPS MUSCLE TEAR, LEFT, INITIAL ENCOUNTER: ICD-10-CM

## 2025-07-03 PROCEDURE — 97140 MANUAL THERAPY 1/> REGIONS: CPT

## 2025-07-03 PROCEDURE — 97112 NEUROMUSCULAR REEDUCATION: CPT

## 2025-07-03 PROCEDURE — 97110 THERAPEUTIC EXERCISES: CPT

## 2025-07-03 NOTE — PROGRESS NOTES
Daily Note     Today's date: 7/3/2025  Patient name: Shyam Headley  : 1990  MRN: [id number]  Referring provider: Marco You MD  Dx:   Encounter Diagnosis     ICD-10-CM    1. Postoperative tendon rupture, subsequent encounter  T81.89XD       2. Biceps muscle tear, left, initial encounter  S46.212A                      Subjective: Doing great.        Objective: See treatment diary below      Assessment: Tolerated treatment well. Patient demonstrated fatigue post treatment and would benefit from continued PT      Plan: Continue per plan of care.         PRECAUTIONS: Follow Protocol as per MD orders  DATE OF SURGERY:     4 weeks - may 15th  5 weeks -   8 weeks - 2025     Biceps Tendon Repair     Phase I ( 0-7 days post-op):   Posterior splint at 90 degrees of elbow flexion.   Wrist and hand gripping exercises.        Phase II (1 week through 5 weeks post-op):   Brace locked at 90 when not working on therapy exercises.   Elbow PROM and self assisted.   1 week post-op .   3 weeks post-op    5 weeks post-op    Shoulder Passive, Active Assisted, and Active Range of motion as tolerated all planes.   Scapular retraction and shoulder shrugs.   Active wrist flexion and extension.   Passive supination and pronation stretch.   No active elbow flexion or supination.   Gripping exercises.        Phase III (6 weeks post-op):   Begin active elbow flexion and supination at 6 weeks post-op.   Begin co-contraction exercises of biceps and triceps.   Elbow ROM.   Discontinue brace 8 weeks post-op (0-145).   8 weeks post-op begin   Light isotonic triceps.   Isotonic wrist flexors/extensors.   Shoulder rotator cuff and scapular strengthening.         Phase IV (12 weeks post-op):   Biceps light isotonics progressing 1 pound per week beginning at week 12 weeks post-op.   Initiate resisted UBE.         Phase V (16 weeks post-op through week 26):   Progressive strengthening as tolerated.  "  Functional progression to athletic activity.   Return to full participation in athletic activity at 5 months post-op.      Date of Surgery 4-17-25  5/15 4 weeks  5/22 5 weeks  5/29 6 weeks  6/12 8 weeks  7/3 11 weeks      Date          6/20         6/30              7/1/25                7/3/25      6/17/25  6/19/25   Visits # 7  2 3  16 5  6    Manual                             PROM elbow in all directions within above protocol   mv delaney   -0-145 0-145 0-145    Full                                                 Neuro               scap squeeze  Shoulder shrugs  10x  10x X10  x10  X10  x10  X10  x10  D/c    Bent over row with scap squeeze 3# x10 ea 5# x15  x10  X10 2#    3# x 20  dumbbells 4# x 15    shoulder raises  Flex  abd 3# x 10 ea  5# x15  5# x15 X20 6#  3# x  20 dumbbells  4# x 15   Overhead press  5#x15 5# x 15 6#x15  4# x15   Wall push ups   x20 X20       wrist pron/sup  0# x 20  x20  x20 x20  0# x 10  x10   Body blade   0 dg 30\" x 2  90 dg 30\"x 2 0dg 30\"x 2  90dg x 2       elbow flexion/ #3  20x  1 x 10  pron  Sup  neutra   5# 1 x 10  pron  Sup  Neutral  1#  1 x 10  pron  Sup  Neutral  3# 3# x 10  3#  1 x 10  pron  Sup  Neutral  ea     Elbow extension #4 20x 5# 5# x 2x 15 6# 2 x 10 Bent over Bent over 4# 15 pron  X15 sup   Landeros pec flys       10# x 20 1# x 30  3# x30     Landeros pulldowns       10#  x20        Landeros punches    10# x 20     Landeros Row to supination    10# x 20              TE               Seratus punches #3 20x   5#  6# full arc   x30 --         table flexion  x10   FIS  D/c  Fis fis   FIS    Prone shoulder habd   5# x 20  5# x 20 6# x 20 2# x 20 3# x 20   Therastick   Flex  Ext  Bending  Supination iso holds   X20  X20  X20  x20 X20  X20  X20  x20 X20  X20  X20 x20  red     Gripping exercise    50# x 30  100# 100#  100#   100#              band rows/ext     gtb ( wrist in pronation) x20   gtb    X3x 10  rtb (wrist pronation)  x20  rtb (wrist pronation)  x20    Band ER      gtb  10x " with wrist sup,pron, and neutral  gtb x 3x 10  rtb x 30 X 20                                                                                                                                                                    Modalities                               Joint protection and  pain management education   x10             HEP education  Given, performed, instructed on HEP  x15                Access Code: [email or web address]  URL: [email or web address]  Date: 05/30/2025  Prepared by: Ismael Stephens    Exercises  - Seated Isometric Elbow Extension  - 1 x daily - 3 x weekly - 1 sets - 10 reps - 2 hold  - Seated Forearm Pronation and Supination AROM  - 1 x daily - 3 x weekly - 1 sets - 10 reps - 2 hold  - Seated Elbow Flexion with Self-Anchored Resistance  - 1 x daily - 3 x weekly - 1 sets - 10 reps - 2 hold    HEP:   Access Code: [email or web address]  URL: [email or web address]  Date: 05/14/2025  Prepared by: Ismael Stephens     Exercises  - Seated Wrist Supination Stretch  - 1 x daily - 7 x weekly - 1 sets - 10 reps  - Wrist Pronation Stretch  - 1 x daily - 7 x weekly - 1 sets - 10 reps  - Seated Scapular Retraction  - 1 x daily - 7 x weekly - 3 sets - 10 reps  - Seated Shoulder Shrugs  - 1 x daily - 7 x weekly - 3 sets - 10 reps  - Bent Over Single Arm Shoulder Row with Dumbbell  - 1 x daily - 7 x weekly - 1 sets - 10 reps  - Seated Shoulder Flexion Towel Slide at Table Top  - 1 x daily - 7 x weekly - 1 sets - 10 reps  - Seated Wrist Radial Deviation Stretch  - 1 x daily - 7 x weekly - 1 sets - 10 reps

## 2025-07-08 ENCOUNTER — OFFICE VISIT (OUTPATIENT)
Dept: PHYSICAL THERAPY | Facility: CLINIC | Age: 35
End: 2025-07-08
Attending: ORTHOPAEDIC SURGERY
Payer: COMMERCIAL

## 2025-07-08 DIAGNOSIS — T81.89XD POSTOPERATIVE TENDON RUPTURE, SUBSEQUENT ENCOUNTER: Primary | ICD-10-CM

## 2025-07-08 DIAGNOSIS — S46.212A BICEPS MUSCLE TEAR, LEFT, INITIAL ENCOUNTER: ICD-10-CM

## 2025-07-08 PROCEDURE — 97112 NEUROMUSCULAR REEDUCATION: CPT

## 2025-07-08 PROCEDURE — 97110 THERAPEUTIC EXERCISES: CPT

## 2025-07-08 PROCEDURE — 97140 MANUAL THERAPY 1/> REGIONS: CPT

## 2025-07-08 NOTE — PROGRESS NOTES
Daily Note     Today's date: 2025  Patient name: Shyam Headley  : 1990  MRN: [id number]  Referring provider: Marco You MD  Dx:   Encounter Diagnosis     ICD-10-CM    1. Postoperative tendon rupture, subsequent encounter  T81.89XD       2. Biceps muscle tear, left, initial encounter  S46.212A           Start Time: 0845  Stop Time: 09  Total time in clinic (min): 45 minutes    Subjective:  doing really well       Objective: See treatment diary below      Assessment: Tolerated treatment well. Patient demonstrated fatigue post treatment and exhibited good technique with therapeutic exercises      Plan: Continue per plan of care.         PRECAUTIONS: Follow Protocol as per MD orders  DATE OF SURGERY:     4 weeks - may 15th  5 weeks -   8 weeks - 2025     Biceps Tendon Repair     Phase I ( 0-7 days post-op):   Posterior splint at 90 degrees of elbow flexion.   Wrist and hand gripping exercises.        Phase II (1 week through 5 weeks post-op):   Brace locked at 90 when not working on therapy exercises.   Elbow PROM and self assisted.   1 week post-op .   3 weeks post-op    5 weeks post-op    Shoulder Passive, Active Assisted, and Active Range of motion as tolerated all planes.   Scapular retraction and shoulder shrugs.   Active wrist flexion and extension.   Passive supination and pronation stretch.   No active elbow flexion or supination.   Gripping exercises.        Phase III (6 weeks post-op):   Begin active elbow flexion and supination at 6 weeks post-op.   Begin co-contraction exercises of biceps and triceps.   Elbow ROM.   Discontinue brace 8 weeks post-op (0-145).   8 weeks post-op begin   Light isotonic triceps.   Isotonic wrist flexors/extensors.   Shoulder rotator cuff and scapular strengthening.         Phase IV (12 weeks post-op):   Biceps light isotonics progressing 1 pound per week beginning at week 12 weeks post-op.   Initiate resisted UBE.     "     Phase V (16 weeks post-op through week 26):   Progressive strengthening as tolerated.   Functional progression to athletic activity.   Return to full participation in athletic activity at 5 months post-op.      Date of Surgery 4-17-25  5/15 4 weeks  5/22 5 weeks  5/29 6 weeks  6/12 8 weeks  7/3 11 weeks      Date          6/20         6/30              7/1/25                7/3/25      7/8/25  6/19/25   Visits # 7  2 3  16 5  6    Manual                             PROM elbow in all directions within above protocol   mv delaney   -0-145 0-145 0-145    Full                                                 Neuro               scap squeeze  Shoulder shrugs  10x  10x X10  x10  X10  x10  X10  x10  D/c    Bent over row with scap squeeze 3# x10 ea 5# x15  x10  X10 2#   7# x 20  dumbbells 4# x 15    shoulder raises  Flex  abd 3# x 10 ea  5# x15  5# x15 X20 6#  7# x  20 dumbbells  4# x 15   Overhead press  5#x15 5# x 15 6#x15 7# x15 4# x15   Wall push ups   x20 X20  Counter top x 20     wrist pron/sup  0# x 20  x20  x20 x20  0# x 10  x10   Body blade   0 dg 30\" x 2  90 dg 30\"x 2 0dg 30\"x 2  90dg x 2  0dg 30\"x 2  90dg x 2      elbow flexion/ #3  20x  1 x 10  pron  Sup  neutra   5# 1 x 10  pron  Sup  Neutral  1#  1 x 10  pron  Sup  Neutral  3# 7# x 10  3#  1 x 10  pron  Sup  Neutral  ea     Elbow extension #4 20x 5# 5# x 2x 15 6# 2 x 10 Bent over Bent over 4# 15 pron  X15 sup   Landeros pec flys       10# x 20 12# x 30  3# x30     Landeros pulldowns       10#  x20  12#x20      Landeros punches    10# x 20 12# x20    Landeros Row to supination    10# x 20 12# x20             TE               Seratus punches #3 20x   5#  6# full arc   x30 --         table flexion  x10   FIS  D/c  Fis fis   FIS    Prone shoulder habd   5# x 20  5# x 20 6# x 20 2# x 20 3# x 20   Therastick   Flex  Ext  Bending  Supination iso holds   X20  X20  X20  x20 X20  X20  X20  x20 X20  X20  X20 x20  red     Gripping exercise    50# x 30  100# 100#  100#   100#       "        band rows/ext     gtb ( wrist in pronation) x20   gtb    X3x 10  rtb (wrist pronation)  x20  rtb (wrist pronation)  x20    Band ER      gtb  10x with wrist sup,pron, and neutral  gtb x 3x 10  rtb x 30 X 20                                                                                                                                                                    Modalities                               Joint protection and  pain management education   x10             HEP education  Given, performed, instructed on HEP  x15                Access Code: [email or web address]  URL: [email or web address]  Date: 05/30/2025  Prepared by: Ismael Stephens    Exercises  - Seated Isometric Elbow Extension  - 1 x daily - 3 x weekly - 1 sets - 10 reps - 2 hold  - Seated Forearm Pronation and Supination AROM  - 1 x daily - 3 x weekly - 1 sets - 10 reps - 2 hold  - Seated Elbow Flexion with Self-Anchored Resistance  - 1 x daily - 3 x weekly - 1 sets - 10 reps - 2 hold    HEP:   Access Code: [email or web address]  URL: [email or web address]  Date: 05/14/2025  Prepared by: Ismael Stephens     Exercises  - Seated Wrist Supination Stretch  - 1 x daily - 7 x weekly - 1 sets - 10 reps  - Wrist Pronation Stretch  - 1 x daily - 7 x weekly - 1 sets - 10 reps  - Seated Scapular Retraction  - 1 x daily - 7 x weekly - 3 sets - 10 reps  - Seated Shoulder Shrugs  - 1 x daily - 7 x weekly - 3 sets - 10 reps  - Bent Over Single Arm Shoulder Row with Dumbbell  - 1 x daily - 7 x weekly - 1 sets - 10 reps  - Seated Shoulder Flexion Towel Slide at Table Top  - 1 x daily - 7 x weekly - 1 sets - 10 reps  - Seated Wrist Radial Deviation Stretch  - 1 x daily - 7 x weekly - 1 sets - 10 reps

## 2025-07-11 ENCOUNTER — OFFICE VISIT (OUTPATIENT)
Dept: PHYSICAL THERAPY | Facility: CLINIC | Age: 35
End: 2025-07-11
Attending: ORTHOPAEDIC SURGERY
Payer: COMMERCIAL

## 2025-07-11 DIAGNOSIS — T81.89XD POSTOPERATIVE TENDON RUPTURE, SUBSEQUENT ENCOUNTER: Primary | ICD-10-CM

## 2025-07-11 DIAGNOSIS — S46.212A BICEPS MUSCLE TEAR, LEFT, INITIAL ENCOUNTER: ICD-10-CM

## 2025-07-11 PROCEDURE — 97140 MANUAL THERAPY 1/> REGIONS: CPT

## 2025-07-11 PROCEDURE — 97112 NEUROMUSCULAR REEDUCATION: CPT

## 2025-07-11 PROCEDURE — 97110 THERAPEUTIC EXERCISES: CPT

## 2025-07-11 NOTE — PROGRESS NOTES
Daily Note     Today's date: 2025  Patient name: Shyam Headlye  : 1990  MRN: [id number]  Referring provider: Marco You MD  Dx:   Encounter Diagnosis     ICD-10-CM    1. Postoperative tendon rupture, subsequent encounter  T81.89XD       2. Biceps muscle tear, left, initial encounter  S46.212A                      Subjective: good       Objective: See treatment diary below      Assessment: Tolerated treatment well. Patient demonstrated fatigue post treatment and would benefit from continued PT - lacking full wrist supination with arms flexed to 90.       Plan: Continue per plan of care.         PRECAUTIONS: Follow Protocol as per MD orders  DATE OF SURGERY:     4 weeks - may 15th  5 weeks -   8 weeks - 2025     Biceps Tendon Repair     Phase I ( 0-7 days post-op):   Posterior splint at 90 degrees of elbow flexion.   Wrist and hand gripping exercises.        Phase II (1 week through 5 weeks post-op):   Brace locked at 90 when not working on therapy exercises.   Elbow PROM and self assisted.   1 week post-op .   3 weeks post-op    5 weeks post-op    Shoulder Passive, Active Assisted, and Active Range of motion as tolerated all planes.   Scapular retraction and shoulder shrugs.   Active wrist flexion and extension.   Passive supination and pronation stretch.   No active elbow flexion or supination.   Gripping exercises.        Phase III (6 weeks post-op):   Begin active elbow flexion and supination at 6 weeks post-op.   Begin co-contraction exercises of biceps and triceps.   Elbow ROM.   Discontinue brace 8 weeks post-op (0-145).   8 weeks post-op begin   Light isotonic triceps.   Isotonic wrist flexors/extensors.   Shoulder rotator cuff and scapular strengthening.         Phase IV (12 weeks post-op):   Biceps light isotonics progressing 1 pound per week beginning at week 12 weeks post-op.   Initiate resisted UBE.         Phase V (16 weeks post-op through  "week 26):   Progressive strengthening as tolerated.   Functional progression to athletic activity.   Return to full participation in athletic activity at 5 months post-op.      Date of Surgery 4-17-25  5/15 4 weeks  5/22 5 weeks  5/29 6 weeks  6/12 8 weeks  7/3 11 weeks      Date          6/20         6/30              7/1/25                7/3/25      7/8/25  7/11/25   Visits # 7  2 3  16 5  6    Manual                             PROM elbow in all directions within above protocol   mv delaney   -0-145 0-145 0-145    Full     wrist supination           MREs with PT x 10                                 Neuro               scap squeeze  Shoulder shrugs  10x  10x X10  x10  X10  x10  X10  x10  D/c    Bent over row with scap squeeze 3# x10 ea 5# x15  x10  X10 2#   7# x 20  dumbbells 8# x 15    shoulder raises  Flex  abd 3# x 10 ea  5# x15  5# x15 X20 6#  7# x  20 dumbbells  8# x 15   Overhead press  5#x15 5# x 15 6#x15 7# x15 8# x15   Wall push ups   x20 X20  Counter top x 20 Step x 20     wrist pron/sup  0# x 20  x20  x20 x20  0# x 10  x10   Body blade   0 dg 30\" x 2  90 dg 30\"x 2 0dg 30\"x 2  90dg x 2  0dg 30\"x 2  90dg x 2  0-90 bodyblade into flex and abduction 1 mins ea    elbow flexion/ #3  20x  1 x 10  pron  Sup  neutra   5# 1 x 10  pron  Sup  Neutral  1#  1 x 10  pron  Sup  Neutral  3# 7# x 10  8#  1 x 10  pron  Sup  Neutral  ea     Elbow extension #4 20x 5# 5# x 2x 15 6# 2 x 10 Bent over Bent over 4# 15 pron  X15 sup   Landeros pec flys       10# x 20 12# x 30  14# x30     Landeros pulldowns       10#  x20  12#x20  14# x20   Landeros punches    10# x 20 12# x20 14#x20   Landeros Row to supination    10# x 20 12# x20 14#x20   Landeros          TE               Seratus punches #3 20x   5#  6# full arc   x30 --     -    table flexion  x10   FIS  D/c  Fis fis      Prone shoulder habd   5# x 20  5# x 20 6# x 20 2# x 20 3# x 20   Therastick   Flex  Ext  Bending  Supination iso holds   X20  X20  X20  x20 X20  X20  X20  x20 " X20  X20  X20 x20  red  X 20 ea yellow   Gripping exercise    50# x 30  100# 100#  100#   100#              Landeros tricep ext at 0dg  And 90 dg     gtb ( wrist in pronation) x20   gtb    X3x 10  rtb (wrist pronation)  x20  x 20   15#  ea                                                                                                                                                                           Modalities                               Joint protection and  pain management education   x10             HEP education  Given, performed, instructed on HEP  x15                Access Code: [email or web address]  URL: [email or web address]  Date: 05/30/2025  Prepared by: Ismael Stephens    Exercises  - Seated Isometric Elbow Extension  - 1 x daily - 3 x weekly - 1 sets - 10 reps - 2 hold  - Seated Forearm Pronation and Supination AROM  - 1 x daily - 3 x weekly - 1 sets - 10 reps - 2 hold  - Seated Elbow Flexion with Self-Anchored Resistance  - 1 x daily - 3 x weekly - 1 sets - 10 reps - 2 hold    HEP:   Access Code: [email or web address]  URL: [email or web address]  Date: 05/14/2025  Prepared by: Ismael Stephens     Exercises  - Seated Wrist Supination Stretch  - 1 x daily - 7 x weekly - 1 sets - 10 reps  - Wrist Pronation Stretch  - 1 x daily - 7 x weekly - 1 sets - 10 reps  - Seated Scapular Retraction  - 1 x daily - 7 x weekly - 3 sets - 10 reps  - Seated Shoulder Shrugs  - 1 x daily - 7 x weekly - 3 sets - 10 reps  - Bent Over Single Arm Shoulder Row with Dumbbell  - 1 x daily - 7 x weekly - 1 sets - 10 reps  - Seated Shoulder Flexion Towel Slide at Table Top  - 1 x daily - 7 x weekly - 1 sets - 10 reps  - Seated Wrist Radial Deviation Stretch  - 1 x daily - 7 x weekly - 1 sets - 10 reps

## 2025-07-15 ENCOUNTER — OFFICE VISIT (OUTPATIENT)
Dept: PHYSICAL THERAPY | Facility: CLINIC | Age: 35
End: 2025-07-15
Attending: ORTHOPAEDIC SURGERY
Payer: COMMERCIAL

## 2025-07-15 DIAGNOSIS — S46.212A BICEPS MUSCLE TEAR, LEFT, INITIAL ENCOUNTER: ICD-10-CM

## 2025-07-15 DIAGNOSIS — T81.89XD POSTOPERATIVE TENDON RUPTURE, SUBSEQUENT ENCOUNTER: Primary | ICD-10-CM

## 2025-07-15 PROCEDURE — 97110 THERAPEUTIC EXERCISES: CPT

## 2025-07-15 PROCEDURE — 97112 NEUROMUSCULAR REEDUCATION: CPT

## 2025-07-15 PROCEDURE — 97140 MANUAL THERAPY 1/> REGIONS: CPT

## 2025-07-17 ENCOUNTER — EVALUATION (OUTPATIENT)
Dept: PHYSICAL THERAPY | Facility: CLINIC | Age: 35
End: 2025-07-17
Attending: ORTHOPAEDIC SURGERY
Payer: COMMERCIAL

## 2025-07-17 DIAGNOSIS — T81.89XD POSTOPERATIVE TENDON RUPTURE, SUBSEQUENT ENCOUNTER: Primary | ICD-10-CM

## 2025-07-17 DIAGNOSIS — S46.212A BICEPS MUSCLE TEAR, LEFT, INITIAL ENCOUNTER: ICD-10-CM

## 2025-07-17 PROCEDURE — 97140 MANUAL THERAPY 1/> REGIONS: CPT

## 2025-07-17 PROCEDURE — 97112 NEUROMUSCULAR REEDUCATION: CPT

## 2025-07-17 PROCEDURE — 97110 THERAPEUTIC EXERCISES: CPT

## 2025-07-17 NOTE — PROGRESS NOTES
PT Evaluation     Today's date: 2025  Patient name: Shyam Headley  : 1990  MRN: [id number]  Referring provider: Marco You MD  Dx:   Encounter Diagnosis     ICD-10-CM    1. Postoperative tendon rupture, subsequent encounter  T81.89XD       2. Biceps muscle tear, left, initial encounter  S46.212A           Start Time: 1015  Stop Time: 1100  Total time in clinic (min): 45 minutes  Flowsheet Rows      Flowsheet Row Most Recent Value   PT/OT G-Codes    Current Score 71   Projected Score 53             Pt. has made extensive gains in ROM and strength in the last couple weeks and is only lacking 5dg of active wrist supination with elbow in full extension due to lack of full strength otherwise passive mobility is WNL and painfree .  Met all goals set at PT for normal daily usage.  If further therapy is warranted for work conditioning please advise, otherwise he is doing very well.       Short term goals: 4 weeks - MET  - Initiate comprehensive HEP to move towards symptom reduction and return to function  - Improve postural awareness and shoulder mechanics to improve overhead activity  - Improve elbow and shoulder ROM to better tolerance with overhead activity and self care  - Patient to reduce pain to 2/10 at its worst to improve activity tolerance  - Patient to exhibit independence with home exercise program for pain management and ROM gains and      Long term goals: 8 weeks -12 weeks -MET all  - Patient to exhibit independence with home exercise program for advanced strengthening  - Patient to improve shoulder/elbow ROM to WFL and with minimal pain  - Improve gross shoulder /elbow strength to 5/5 to allow to return to heavy household activity and self care  -.Patient to reduce pain to 0/10 at its worst to improve QOL and return to function  - Reaching behind back without pain for all dressing  - Patient to return to sleeping without pain   - FOTO to predicted score or better   - Reaching  overhead to put dishes in cabinet with min to no compensatory shoulder hiking     5/14/2025 FOTO:   4   predicted:  53  7/17 - 71 54 pred     Impairments:         Prognosis:  Excellent  Positive and negative prognostic indicator(s):  positive attitude about recovery and none     Planned interventions: work conditioning if mandated          History of Current Injury:  Injury to left arm while cutting tree branches on April 10th.  Surgery to distal biceps April 17th         Pain location: wrist tightness  Pain descriptors:  tightness  Pain intensity at worst 0  Pain Intensity at best 0           Aggravating factors/problems: Severe disability due to recent surgical procedure and currently in immobilization period, sleeping  Easing factors: medications, ice     Imaging: Prior to surgery MRI and x rays  Hobbies/Interests: finish tree work and yardwork, handywork  Occupation:  [employer]        OBJECTIVE:        MMT:     Shoulder MMT EVAL  EVAL Re-eval Re-eval Re-eval   Date 5/14/2025 5/14/2025 7/17       Side Involved side Uninvolved side  involved side       Shoulder flexion  2-/5 ** 5/5  5       Shoulder Abduction  2-/5 **  5/5  5       Shoulder Internal rotation   2-/5 **  5/5  5       Shoulder external rotation  2-/5 **  5/5  5       Elbow Flexion nt  5/5  5       Elbow extension  nt   5/5  5       Mid trapezius  nt/ 5  5/5  5       Lower trapezius  nt/ 5  5/5  5       Rhomboids  nt/ 5  5/5  5       Gross  nt /lbs  nt /lbs  150#           Nt= not tested due to restrictions from recent surgery  2-/5 =  unable to perform AROM through antigravity positioning  ** = pain         ROM:     Shoulder AROM/PROM Evaluation Evaluation Re-evaluation Re-evaluation Re-evaluation     Involved uninvolved Involved side       Date 5/14/2025 5/14/2025 7/17        Shoulder flexion wnl wnl wnl       Shoulder abduction wnl wnl  wnl       Shoulder External rotation wnl wnl  wnl       Shoulder Internal rotation wnl wnl  wnl         elbow flexion  110 passive  wnl  wnl        elbow extension  -45 passive  wnl  wnl        wrist flexion      wnl       Wrsit ext   wnl     Supination elbow bent  Supination elbow ext   90  85     pronation   90           EDEMA:  5/14/2025 :  Not present in left elbow and shoulder  7/17/25- none     PALPATION:  5/14/2025 normal   7/17- normal      SPECIAL TESTS: are Not applicable due to recent surgical procedure and/or MD restrictions from protocol -      PRECAUTIONS: Follow Protocol as per MD orders  DATE OF SURGERY:  April 17th   4 weeks - may 15th  5 weeks - 5/22  8 weeks - 7/9/2025         Date          7/15/25         7/17              7/1/25                7/3/25      7/8/25  7/11/25   Visits # 7  2 3  16 5  6    Manual                               PROM elbow in all directions within above protocol   delaney delaney   -0-145 0-145 0-145     Full     wrist supination  MREs with PT x 10  x10       MREs with PT x 10                                   Neuro                                                shoulder raises  Flex  abd 9# x 10 ea  9# x15        Overhead press 9# x 15 9#x15       push ups Floor x 20 x30         wrist pron/sup  0# x 20  x20       Body blade 0-90 bodyblade into flex and abduction 1 mins ea 90dg and 150dg         elbow flexion/ #9  20x  1 x 10  pron  Sup  Neutral  ea  1 x 10  pron  Sup  neutra   9#                Landeros pec flys 17.5#  x20 17.5#        Landeros pulldowns 20#   20# x 20       Landeros punches 15#  15# x 20       Landeros Row to supination 15# 15# x20                   TE           Seratus punches #3 20x   5#        table flexion  x10   FIS                 Therastick   Flex  Ext  Bending  Supination iso holds  X20 ea X20  X20  X20  x20       Gripping exercise  100#  150# x 10  100# x 20       Powerweb Yellow x 10   Pron/sup  yellow   X 20 sup/pron        Tigre tricep ext at 0dg  And 90 dg X 20   20# at 90dg  And neutral                                                                                                                                                            Modalities                               Joint protection and  pain management education   x10             HEP education  Given, performed, instructed on HEP  x15                Access Code: [email or web address]

## 2025-07-18 ENCOUNTER — OFFICE VISIT (OUTPATIENT)
Age: 35
End: 2025-07-18

## 2025-07-18 VITALS — WEIGHT: 201 LBS | HEIGHT: 70 IN | BODY MASS INDEX: 28.77 KG/M2

## 2025-07-18 DIAGNOSIS — Z98.890 S/P TENDON REPAIR: Primary | ICD-10-CM

## 2025-07-18 PROCEDURE — 99024 POSTOP FOLLOW-UP VISIT: CPT | Performed by: ORTHOPAEDIC SURGERY

## 2025-07-18 NOTE — PROGRESS NOTES
"Name: hSyam Headley      : 1990      MRN: [id number]  Encounter Provider: Marco You MD  Encounter Date: 2025   Encounter department: [facility] [place]  :  Assessment & Plan  S/P tendon repair              Shyam Headley is a pleasant 35 y.o. male  3-month status post left distal biceps repair.  He is overall doing well.  He will continue work physical therapy.  He may return to work on light to medium duty with no lifting over 30 pounds.  Plan to see him back in 6 weeks and expect full return at that point.    Subjective: 3 months status post left distal bicep tendon repair    History: Shyam Headley is a 35 y.o. male presents now 3-month status post distal biceps tendon repair.  Overall doing well.  He is working with therapy.  Has started work on strengthening.  He is regained full range of motion.  No significant pain.  Some soreness with increasing activity    Estimated body mass index is 28.84 kg/m² as calculated from the following:    Height as of this encounter: 5' 10\" (1.778 m).    Weight as of this encounter: 91.2 kg (201 lb).    No results found for: \"HGBA1C\"    Social History     Occupational History   • Not on file   Tobacco Use   • Smoking status: Never   • Smokeless tobacco: Never   Vaping Use   • Vaping status: Never Used   Substance and Sexual Activity   • Alcohol use: Not Currently     Comment: socially   • Drug use: Never   • Sexual activity: Not on file       Objective:  Left Elbow Exam     Tenderness   The patient is experiencing no tenderness.     Range of Motion   Extension:  0   Flexion:  130     Muscle Strength   Pronation:  5/5   Supination:  5/5     Other   Erythema: absent  Scars: present  Sensation: normal  Pulse: present    Comments:  Well-healed surgical scar.  5 out of 5 strength with biceps.            There were no vitals filed for this visit.    Past Medical History[1]    Past Surgical History[1]    Family " History[1]    Current Medications[1]    Allergies[1]      Review of Systems   Constitutional: Negative.  Negative for chills and fever.   HENT: Negative.  Negative for ear pain and sore throat.    Eyes: Negative.  Negative for pain and redness.   Respiratory: Negative.  Negative for shortness of breath and wheezing.    Cardiovascular: Negative.  Negative for chest pain and palpitations.   Gastrointestinal: Negative.  Negative for abdominal pain and blood in stool.   Endocrine: Negative.  Negative for polydipsia and polyuria.   Genitourinary: Negative.  Negative for difficulty urinating and dysuria.   Musculoskeletal:  Positive for myalgias.        As noted in HPI   Skin: Negative.  Negative for pallor and rash.   Allergic/Immunologic: Negative.    Neurological: Negative.  Negative for dizziness and numbness.   Hematological: Negative.  Negative for adenopathy. Does not bruise/bleed easily.   Psychiatric/Behavioral: Negative.  Negative for confusion and suicidal ideas.          Physical Exam  Vitals and nursing note reviewed.   Constitutional:       General: He is not in acute distress.     Appearance: Normal appearance. He is well-developed.      Comments: Body mass index is 28.84 kg/m².   HENT:      Head: Normocephalic and atraumatic.      Right Ear: External ear normal.      Left Ear: External ear normal.     Eyes:      General: No scleral icterus.     Extraocular Movements: Extraocular movements intact.      Conjunctiva/sclera: Conjunctivae normal.     Neck:      Vascular: No JVD.     Cardiovascular:      Rate and Rhythm: Normal rate.      Pulses: Normal pulses.   Pulmonary:      Effort: Pulmonary effort is normal. No respiratory distress.   Abdominal:      Palpations: Abdomen is soft.     Musculoskeletal:      Cervical back: Normal range of motion.      Comments: See ortho exam     Skin:     General: Skin is warm and dry.     Neurological:      General: No focal deficit present.      Mental Status: He is alert  and oriented to person, place, and time. Mental status is at baseline.      Coordination: Coordination normal.     Psychiatric:         Mood and Affect: Mood normal.         Behavior: Behavior normal.         Thought Content: Thought content normal.         Judgment: Judgment normal.         Scribe Attestation    I,:  Nino Urias PA-C am acting as a scribe while in the presence of the attending physician.:       I,:  Marco You MD personally performed the services described in this documentation    as scribed in my presence.:           This document was created using speech voice recognition software.   Grammatical errors, random word insertions, pronoun errors, and incomplete sentences are an occasional consequence of this system due to software limitations, ambient noise, and hardware issues.   Any formal questions or concerns about content, text, or information contained within the body of this dictation should be directly addressed to the provider for clarification.           [1]  No past medical history on file.[1]  Past Surgical History:  Procedure Laterality Date   • APPENDECTOMY     • SD RINSJ RPTD BICEPS/TRICEPS TDN DSTL W/WO TDN GRF Left 4/24/2025    Procedure: REPAIR TENDON BICEPS, Distal;  Surgeon: Marco You MD;  Location: [facility];  Service: Orthopedics   [1]  Family History  Problem Relation Name Age of Onset   • No Known Problems Mother     • No Known Problems Father     • No Known Problems Sister     • No Known Problems Brother     • No Known Problems Maternal Aunt     • No Known Problems Maternal Uncle     • No Known Problems Paternal Aunt     • No Known Problems Paternal Uncle     • No Known Problems Maternal Grandmother     • No Known Problems Maternal Grandfather     • No Known Problems Paternal Grandmother     • No Known Problems Paternal Grandfather     [1]    Current Outpatient Medications:   •  magnesium 30 MG tablet, Take 30 mg by mouth in the morning  and 30 mg in the evening., Disp: , Rfl:   •  Multiple Vitamin (multivitamin) capsule, Take 1 capsule by mouth in the morning., Disp: , Rfl:   •  Turmeric (QC TUMERIC COMPLEX PO), Take by mouth, Disp: , Rfl: [1]  No Known Allergies

## 2025-07-29 ENCOUNTER — OFFICE VISIT (OUTPATIENT)
Dept: PHYSICAL THERAPY | Facility: CLINIC | Age: 35
End: 2025-07-29
Attending: ORTHOPAEDIC SURGERY
Payer: COMMERCIAL

## 2025-07-29 DIAGNOSIS — T81.89XD POSTOPERATIVE TENDON RUPTURE, SUBSEQUENT ENCOUNTER: ICD-10-CM

## 2025-07-29 DIAGNOSIS — S46.212A BICEPS MUSCLE TEAR, LEFT, INITIAL ENCOUNTER: Primary | ICD-10-CM

## 2025-07-29 PROCEDURE — 97112 NEUROMUSCULAR REEDUCATION: CPT

## 2025-07-29 PROCEDURE — 97110 THERAPEUTIC EXERCISES: CPT

## 2025-07-29 PROCEDURE — 97140 MANUAL THERAPY 1/> REGIONS: CPT

## 2025-07-31 ENCOUNTER — OFFICE VISIT (OUTPATIENT)
Dept: PHYSICAL THERAPY | Facility: CLINIC | Age: 35
End: 2025-07-31
Attending: ORTHOPAEDIC SURGERY
Payer: COMMERCIAL

## 2025-07-31 DIAGNOSIS — T81.89XD POSTOPERATIVE TENDON RUPTURE, SUBSEQUENT ENCOUNTER: ICD-10-CM

## 2025-07-31 DIAGNOSIS — S46.212A BICEPS MUSCLE TEAR, LEFT, INITIAL ENCOUNTER: Primary | ICD-10-CM

## 2025-07-31 PROCEDURE — 97112 NEUROMUSCULAR REEDUCATION: CPT

## 2025-07-31 PROCEDURE — 97140 MANUAL THERAPY 1/> REGIONS: CPT

## 2025-07-31 PROCEDURE — 97110 THERAPEUTIC EXERCISES: CPT

## 2025-08-05 ENCOUNTER — OFFICE VISIT (OUTPATIENT)
Dept: PHYSICAL THERAPY | Facility: CLINIC | Age: 35
End: 2025-08-05
Attending: ORTHOPAEDIC SURGERY
Payer: COMMERCIAL

## 2025-08-05 DIAGNOSIS — S46.212A BICEPS MUSCLE TEAR, LEFT, INITIAL ENCOUNTER: Primary | ICD-10-CM

## 2025-08-05 DIAGNOSIS — T81.89XD POSTOPERATIVE TENDON RUPTURE, SUBSEQUENT ENCOUNTER: ICD-10-CM

## 2025-08-05 PROCEDURE — 97112 NEUROMUSCULAR REEDUCATION: CPT

## 2025-08-05 PROCEDURE — 97140 MANUAL THERAPY 1/> REGIONS: CPT

## 2025-08-05 PROCEDURE — 97110 THERAPEUTIC EXERCISES: CPT

## 2025-08-07 ENCOUNTER — OFFICE VISIT (OUTPATIENT)
Dept: PHYSICAL THERAPY | Facility: CLINIC | Age: 35
End: 2025-08-07
Attending: ORTHOPAEDIC SURGERY
Payer: COMMERCIAL

## 2025-08-07 DIAGNOSIS — T81.89XD POSTOPERATIVE TENDON RUPTURE, SUBSEQUENT ENCOUNTER: ICD-10-CM

## 2025-08-07 DIAGNOSIS — S46.212A BICEPS MUSCLE TEAR, LEFT, INITIAL ENCOUNTER: Primary | ICD-10-CM

## 2025-08-07 PROCEDURE — 97140 MANUAL THERAPY 1/> REGIONS: CPT

## 2025-08-07 PROCEDURE — 97110 THERAPEUTIC EXERCISES: CPT

## 2025-08-07 PROCEDURE — 97112 NEUROMUSCULAR REEDUCATION: CPT

## 2025-08-12 ENCOUNTER — OFFICE VISIT (OUTPATIENT)
Dept: PHYSICAL THERAPY | Facility: CLINIC | Age: 35
End: 2025-08-12
Attending: ORTHOPAEDIC SURGERY
Payer: COMMERCIAL

## 2025-08-14 ENCOUNTER — OFFICE VISIT (OUTPATIENT)
Dept: PHYSICAL THERAPY | Facility: CLINIC | Age: 35
End: 2025-08-14
Attending: ORTHOPAEDIC SURGERY
Payer: COMMERCIAL

## 2025-08-19 ENCOUNTER — OFFICE VISIT (OUTPATIENT)
Dept: PHYSICAL THERAPY | Facility: CLINIC | Age: 35
End: 2025-08-19
Attending: ORTHOPAEDIC SURGERY
Payer: COMMERCIAL

## 2025-08-19 DIAGNOSIS — S46.212A BICEPS MUSCLE TEAR, LEFT, INITIAL ENCOUNTER: Primary | ICD-10-CM

## 2025-08-19 DIAGNOSIS — T81.89XD POSTOPERATIVE TENDON RUPTURE, SUBSEQUENT ENCOUNTER: ICD-10-CM

## 2025-08-19 PROCEDURE — 97110 THERAPEUTIC EXERCISES: CPT

## 2025-08-19 PROCEDURE — 97112 NEUROMUSCULAR REEDUCATION: CPT

## 2025-08-19 PROCEDURE — 97140 MANUAL THERAPY 1/> REGIONS: CPT

## 2025-08-21 ENCOUNTER — OFFICE VISIT (OUTPATIENT)
Dept: PHYSICAL THERAPY | Facility: CLINIC | Age: 35
End: 2025-08-21
Attending: ORTHOPAEDIC SURGERY
Payer: COMMERCIAL

## 2025-08-21 DIAGNOSIS — S46.212A BICEPS MUSCLE TEAR, LEFT, INITIAL ENCOUNTER: Primary | ICD-10-CM

## 2025-08-21 DIAGNOSIS — T81.89XD POSTOPERATIVE TENDON RUPTURE, SUBSEQUENT ENCOUNTER: ICD-10-CM

## 2025-08-21 PROCEDURE — 97110 THERAPEUTIC EXERCISES: CPT

## 2025-08-21 PROCEDURE — 97112 NEUROMUSCULAR REEDUCATION: CPT

## 2025-08-21 PROCEDURE — 97140 MANUAL THERAPY 1/> REGIONS: CPT

## (undated) DEVICE — ASTOUND STANDARD SURGICAL GOWN, XL: Brand: CONVERTORS

## (undated) DEVICE — LIGACLIP MCA MULTIPLE CLIP APPLIERS, 20 MEDIUM CLIPS: Brand: LIGACLIP

## (undated) DEVICE — GENERAL/ PLASTIC TRAY STANDARD: Brand: DEROYAL

## (undated) DEVICE — DRAPE SHEET X-LG

## (undated) DEVICE — GLOVE INDICATOR PI UNDERGLOVE SZ 8 BLUE

## (undated) DEVICE — CHLORAPREP HI-LITE 26ML ORANGE

## (undated) DEVICE — SUTURETAPE FIBERLOOP W/NDL WHITE/BL AR-7534

## (undated) DEVICE — PAD CAST 4 IN COTTON NON STERILE

## (undated) DEVICE — GLOVE SRG BIOGEL ORTHOPEDIC 8

## (undated) DEVICE — STERILE DOUBLE BASIN SET PACK: Brand: CARDINAL HEALTH

## (undated) DEVICE — Device

## (undated) DEVICE — ARM SLING: Brand: DEROYAL

## (undated) DEVICE — GLOVE INDICATOR PI UNDERGLOVE SZ 6.5 BLUE

## (undated) DEVICE — ACE WRAP 4 IN UNSTERILE

## (undated) DEVICE — GLOVE SRG BIOGEL 6.5

## (undated) DEVICE — BANDAGE, ESMARK LF STR 6"X9' (20/CS): Brand: CYPRESS

## (undated) DEVICE — 3M™ STERI-DRAPE™ U-DRAPE 1015: Brand: STERI-DRAPE™

## (undated) DEVICE — PADDING CAST 4 IN  COTTON STRL

## (undated) DEVICE — TIBURON SPLIT SHEET: Brand: CONVERTORS

## (undated) DEVICE — EXOFIN PRECISION PEN HIGH VISCOSITY TOPICAL SKIN ADHESIVE: Brand: EXOFIN PRECISION PEN, 1G

## (undated) DEVICE — STOCKINETTE,IMPERVIOUS,12X48,STERILE: Brand: MEDLINE

## (undated) DEVICE — COBAN 4 IN STERILE

## (undated) DEVICE — DISPOSABLE TOURNIQUET CUFF DUAL BLADDER, DUAL PORT AND QUICK CONNECT CONNECTOR: Brand: COLOR CUFF